# Patient Record
Sex: FEMALE | Race: WHITE | NOT HISPANIC OR LATINO | Employment: OTHER | URBAN - NONMETROPOLITAN AREA
[De-identification: names, ages, dates, MRNs, and addresses within clinical notes are randomized per-mention and may not be internally consistent; named-entity substitution may affect disease eponyms.]

---

## 2018-11-20 ENCOUNTER — CONSULT (OUTPATIENT)
Dept: ONCOLOGY | Facility: CLINIC | Age: 68
End: 2018-11-20

## 2018-11-20 VITALS
BODY MASS INDEX: 19.46 KG/M2 | SYSTOLIC BLOOD PRESSURE: 110 MMHG | OXYGEN SATURATION: 93 % | HEIGHT: 64 IN | RESPIRATION RATE: 16 BRPM | HEART RATE: 77 BPM | DIASTOLIC BLOOD PRESSURE: 55 MMHG | TEMPERATURE: 99.1 F | WEIGHT: 114 LBS

## 2018-11-20 DIAGNOSIS — C34.92 NON-SMALL CELL CANCER OF LEFT LUNG (HCC): Primary | ICD-10-CM

## 2018-11-20 PROCEDURE — 99205 OFFICE O/P NEW HI 60 MIN: CPT | Performed by: INTERNAL MEDICINE

## 2018-11-20 RX ORDER — BENZONATATE 100 MG/1
100 CAPSULE ORAL 3 TIMES DAILY PRN
COMMUNITY
End: 2022-09-20

## 2018-11-20 RX ORDER — LOSARTAN POTASSIUM 50 MG/1
50 TABLET ORAL DAILY
COMMUNITY
Start: 2018-09-08 | End: 2022-09-16 | Stop reason: SDUPTHER

## 2018-11-20 RX ORDER — LEVOTHYROXINE SODIUM 0.1 MG/1
100 TABLET ORAL DAILY
COMMUNITY
Start: 2018-11-10 | End: 2022-09-20

## 2018-11-20 RX ORDER — PROMETHAZINE HYDROCHLORIDE 25 MG/1
25 TABLET ORAL EVERY 6 HOURS PRN
COMMUNITY

## 2018-11-20 RX ORDER — HYDROCODONE BITARTRATE AND ACETAMINOPHEN 5; 325 MG/1; MG/1
1 TABLET ORAL EVERY 6 HOURS PRN
COMMUNITY
End: 2023-03-14

## 2018-11-20 RX ORDER — MONTELUKAST SODIUM 10 MG/1
10 TABLET ORAL DAILY
COMMUNITY
Start: 2018-09-08

## 2018-11-20 RX ORDER — SIMVASTATIN 20 MG
20 TABLET ORAL DAILY
COMMUNITY
Start: 2018-11-10 | End: 2022-06-05 | Stop reason: SDUPTHER

## 2018-11-20 RX ORDER — PROCHLORPERAZINE MALEATE 10 MG
10 TABLET ORAL EVERY 6 HOURS PRN
COMMUNITY
End: 2023-01-24

## 2018-11-20 RX ORDER — FERROUS SULFATE 325(65) MG
325 TABLET ORAL
COMMUNITY
End: 2023-01-24

## 2018-11-20 NOTE — PROGRESS NOTES
CHIEF COMPLAINT: Second opinion for lung cancer    REASON FOR REFERRAL: Same      RECORDS OBTAINED  Records of the patients history including those obtained from  Dr. Quiroz were reviewed and summarized in detail.    Oncology/Hematology History    1. Non-small cell lung cancer stage IIIc T3 N3 M0 poorly differentiated squamous cell carcinoma seen previously by Dr. Quiroz.  I do not have her images but according to records from Dr. Quiroz, this was found as an obstructing mass in the left lower lobe on bronchoscopy and mediastinal nodes were involved and hence precluded surgical resection.  Molecular testing including EGFR and ALK was reportedly negative.  I do not know if Ros 1 was tested or not.  PDL 1 was considered limited.  She received carboplatin and Taxol plus radiation in August.  Restaging PET 9/6/18 showed significant improvement with resolution of precarinal and subcarinal and posterior left midthoracic hypermetabolism with some residua SUV 4.8.  She was started on Durvalumab October 2018.  I saw for the first time 11/20/18.  2. COPD  3. Hiatal hernia with reflux  4. Hyperlipidemia  5. Hypertension  6. Hypothyroidism  7. Quit 40 pack year history of smoking        Non-small cell cancer of left lung (CMS/HCC)    11/20/2018 Initial Diagnosis     Non-small cell cancer of left lung (CMS/HCC)            HISTORY OF PRESENT ILLNESS:  The patient is a 68 y.o.  female, referred for second opinion on lung cancer.  See above for her history    REVIEW OF SYSTEMS:  A 14 point review of systems was performed and is negative except as noted above.    Past Medical History:   Diagnosis Date   • Disease of thyroid gland    • Hypertension      Past Surgical History:   Procedure Laterality Date   • TUBAL ABDOMINAL LIGATION  1984    [place]       Current Outpatient Medications on File Prior to Visit   Medication Sig Dispense Refill   • benzonatate (TESSALON) 100 MG capsule Take 100 mg by mouth 3 (Three) Times a Day  "As Needed for Cough.     • ferrous sulfate 325 (65 FE) MG tablet Take 325 mg by mouth Daily With Breakfast.     • HYDROcod Polst-CPM Polst ER (TUSSIONEX PENNKINETIC ER) 10-8 MG/5ML ER suspension Take 5 mL by mouth Every 12 (Twelve) Hours As Needed.     • HYDROcodone-acetaminophen (NORCO) 5-325 MG per tablet Take 1 tablet by mouth Every 6 (Six) Hours As Needed.     • prochlorperazine (COMPAZINE) 10 MG tablet Take 10 mg by mouth Every 6 (Six) Hours As Needed for Nausea or Vomiting.     • promethazine (PHENERGAN) 25 MG tablet Take 25 mg by mouth Every 6 (Six) Hours As Needed for Nausea or Vomiting.     • levothyroxine (SYNTHROID, LEVOTHROID) 100 MCG tablet Take 100 mcg by mouth Daily.     • losartan (COZAAR) 50 MG tablet Take 50 mg by mouth Daily.     • montelukast (SINGULAIR) 10 MG tablet Take 10 mg by mouth Daily.     • simvastatin (ZOCOR) 20 MG tablet Take 20 mg by mouth Daily.       No current facility-administered medications on file prior to visit.        No Known Allergies    Social History     Socioeconomic History   • Marital status: Unknown     Spouse name: Not on file   • Number of children: Not on file   • Years of education: Not on file   • Highest education level: Not on file   Tobacco Use   • Smoking status: Former Smoker     Packs/day: 2.00     Years: 5.00     Pack years: 10.00     Last attempt to quit:      Years since quittin.8   • Smokeless tobacco: Never Used       Family History   Problem Relation Age of Onset   • Breast cancer Sister        PHYSICAL EXAM:    /55   Pulse 77   Temp 99.1 °F (37.3 °C)   Resp 16   Ht 162.6 cm (64\")   Wt 51.7 kg (114 lb)   SpO2 93%   BMI 19.57 kg/m²     ECOG: (0) Fully active, able to carry on all predisease performance without restriction  General: well appearing female in no acute distress  HEENT: sclera anicteric, oropharynx clear  Lymphatics: no cervical, supraclavicular, inguinal, or axillary adenopathy  Cardiovascular: regular rate and rhythm, " no murmurs  Neck: Supple; No thyromegaly  Lungs: clear to auscultation bilaterally. No respiratory distress.   Abdomen: soft, nontender, nondistended.  No palpable organomegaly  Extremities: no cyanosis, clubbing, edema, or cords  Skin: no rashes, lesions, bruising, or petechiae  Neuro: Alert and oriented x 4; Moving all extremities.  Psych: No anxiety or depression    No visits with results within 6 Week(s) from this visit.   Latest known visit with results is:   No results found for any previous visit.       Assessment/Plan     1. Lung cancer squamous cell carcinoma poorly differentiated: I reviewed her history and information provided from Dr. Quiroz.  For unresectable stage III squamous cell carcinoma of the lung, I consider carboplatin and Taxol with radiation followed by Durvalumab to be a very reasonable plan based on the results of the [place] trial and would've been precisely what I would've recommended were I am discharge from the beginning of her therapy.  Commended the care and decision making capabilities of Dr. Quiroz.  She will follow-up with Dr. Quiroz and I remain available should questions arise.  Discussed with patient 1 hour greater than 50% spent counseling.      Darci Alcala MD    11/20/2018

## 2022-06-04 NOTE — TELEPHONE ENCOUNTER
Rx Refill Note  Requested Prescriptions     Pending Prescriptions Disp Refills   • simvastatin (ZOCOR) 20 MG tablet [Pharmacy Med Name: Simvastatin 20 MG Oral Tablet] 90 tablet 0     Sig: TAKE 1 TABLET BY MOUTH ONCE DAILY IN THE EVENING      Last office visit with prescribing clinician: Visit date not found      Next office visit with prescribing clinician: Visit date not found            Eron Colin MA  06/04/22, 12:26 EDT

## 2022-06-05 RX ORDER — SIMVASTATIN 20 MG
TABLET ORAL
Qty: 90 TABLET | Refills: 0 | Status: SHIPPED | OUTPATIENT
Start: 2022-06-05 | End: 2022-09-20 | Stop reason: SDUPTHER

## 2022-06-27 RX ORDER — LEVOTHYROXINE SODIUM 75 UG/1
TABLET ORAL
Qty: 90 TABLET | Refills: 0 | Status: SHIPPED | OUTPATIENT
Start: 2022-06-27 | End: 2022-09-20

## 2022-08-29 RX ORDER — LOSARTAN POTASSIUM 50 MG/1
TABLET ORAL
Qty: 90 TABLET | Refills: 0 | OUTPATIENT
Start: 2022-08-29

## 2022-08-31 RX ORDER — SIMVASTATIN 20 MG
TABLET ORAL
Qty: 90 TABLET | Refills: 0 | OUTPATIENT
Start: 2022-08-31

## 2022-09-06 RX ORDER — LOSARTAN POTASSIUM 50 MG/1
TABLET ORAL
Qty: 90 TABLET | Refills: 0 | OUTPATIENT
Start: 2022-09-06

## 2022-09-14 RX ORDER — LOSARTAN POTASSIUM 50 MG/1
TABLET ORAL
Qty: 90 TABLET | Refills: 0 | OUTPATIENT
Start: 2022-09-14

## 2022-09-16 RX ORDER — LOSARTAN POTASSIUM 50 MG/1
50 TABLET ORAL DAILY
Qty: 30 TABLET | Refills: 0 | Status: SHIPPED | OUTPATIENT
Start: 2022-09-16 | End: 2022-09-20 | Stop reason: SDUPTHER

## 2022-09-16 NOTE — TELEPHONE ENCOUNTER
Caller: Curt Nessa    Relationship: Self    Best call back number: [phone]    Requested Prescriptions:   Requested Prescriptions     Pending Prescriptions Disp Refills   • losartan (COZAAR) 50 MG tablet       Sig: Take 1 tablet by mouth Daily.      Euthyrox 75 MCG tablet    Pharmacy where request should be sent: [facility] [address] [place] [address] [phone] [phone]     Additional details provided by patient:     PATIENT STATED SHE IS OUT OF THE LOSARTAN AND HAS BEEN FOR 2 WEEKS    PATIENT IS ON HER LAST WEEK OF EUTHYROX    Does the patient have less than a 3 day supply:  [x] Yes  [] No    Denver Calabrese Rep   09/16/22 11:03 EDT

## 2022-09-20 ENCOUNTER — OFFICE VISIT (OUTPATIENT)
Dept: FAMILY MEDICINE CLINIC | Facility: CLINIC | Age: 72
End: 2022-09-20

## 2022-09-20 VITALS — BODY MASS INDEX: 16.73 KG/M2 | HEIGHT: 64 IN | WEIGHT: 98 LBS

## 2022-09-20 DIAGNOSIS — E46 PROTEIN-CALORIE MALNUTRITION, UNSPECIFIED SEVERITY: ICD-10-CM

## 2022-09-20 DIAGNOSIS — C34.30 NON-SMALL CELL CANCER OF LOWER LOBE OF LUNG: ICD-10-CM

## 2022-09-20 DIAGNOSIS — E78.2 MIXED HYPERLIPIDEMIA: ICD-10-CM

## 2022-09-20 DIAGNOSIS — I10 PRIMARY HYPERTENSION: ICD-10-CM

## 2022-09-20 DIAGNOSIS — E03.9 ACQUIRED HYPOTHYROIDISM: Primary | ICD-10-CM

## 2022-09-20 PROCEDURE — 99214 OFFICE O/P EST MOD 30 MIN: CPT | Performed by: FAMILY MEDICINE

## 2022-09-20 RX ORDER — OMEPRAZOLE 20 MG/1
20 CAPSULE, DELAYED RELEASE ORAL DAILY
COMMUNITY
Start: 2022-08-21 | End: 2022-09-20 | Stop reason: SDUPTHER

## 2022-09-20 RX ORDER — BLACK COHOSH ROOT EXTRACT 80 MG
1 CAPSULE ORAL DAILY
COMMUNITY

## 2022-09-20 RX ORDER — SIMVASTATIN 20 MG
20 TABLET ORAL EVERY EVENING
Qty: 90 TABLET | Refills: 1 | Status: SHIPPED | OUTPATIENT
Start: 2022-09-20 | End: 2023-01-24

## 2022-09-20 RX ORDER — LEVOTHYROXINE SODIUM 0.07 MG/1
75 TABLET ORAL DAILY
Qty: 90 TABLET | Refills: 1 | Status: CANCELLED | OUTPATIENT
Start: 2022-09-20

## 2022-09-20 RX ORDER — LOSARTAN POTASSIUM 50 MG/1
50 TABLET ORAL DAILY
Qty: 90 TABLET | Refills: 1 | Status: SHIPPED | OUTPATIENT
Start: 2022-09-20 | End: 2023-01-24

## 2022-09-20 RX ORDER — LEVOTHYROXINE SODIUM 0.1 MG/1
100 TABLET ORAL
Qty: 90 TABLET | Refills: 3 | Status: SHIPPED | OUTPATIENT
Start: 2022-09-20 | End: 2022-11-11

## 2022-09-20 RX ORDER — OMEPRAZOLE 20 MG/1
20 CAPSULE, DELAYED RELEASE ORAL DAILY
Qty: 90 CAPSULE | Refills: 1 | Status: SHIPPED | OUTPATIENT
Start: 2022-09-20

## 2022-09-20 NOTE — PROGRESS NOTES
Patient was seen today through synchronous audio/video technology. Verbal consent was obtained. The patient was located at home. Dr. Tirado was located at [facility] in [place], [place]. Vitals signs were not obtained due to lack of home monitoring access.       Date: 2022   Patient Name: Nessa Elias  : 1950   MRN: [id number]     Chief Complaint:    Chief Complaint   Patient presents with   • Med Refill       History of Present Illness: Nessa Elias is a 72 y.o. female who is here today for Medication refills.  Recent labs 2 weeks ago revealed a TSH of 116.  Her oncologist instructed her to start taking 1 and half tablets of her Synthroid.  She is taking the medication correctly first thing in the morning on an empty stomach.    She also requests refills of losartan, simvastatin, and omeprazole that she reports continues to work well without side effects.  She is currently undergoing treatment for non-small cell lung cancer.           Review of Systems:   Review of Systems   Constitutional: Positive for fatigue and unexpected weight loss. Negative for chills and fever.   Respiratory: Negative for cough and shortness of breath.    Cardiovascular: Negative for chest pain and palpitations.   Gastrointestinal: Negative for abdominal pain, constipation, diarrhea, nausea and vomiting.   Musculoskeletal: Negative for back pain and myalgias.   Neurological: Negative for dizziness and headache.   Psychiatric/Behavioral: Negative for depressed mood. The patient is not nervous/anxious.        Past Medical History:   Past Medical History:   Diagnosis Date   • Breast cancer (HCC)    • Disease of thyroid gland    • Hyperlipidemia    • Hypertension    • Hypertension    • Thyroid disease        Past Surgical History:   Past Surgical History:   Procedure Laterality Date   • TUBAL ABDOMINAL LIGATION      [place]       Family History:   Family History   Problem Relation Age of Onset   • Breast  "cancer Sister        Social History:   Social History     Socioeconomic History   • Marital status: Unknown   Tobacco Use   • Smoking status: Former Smoker     Packs/day: 2.00     Years: 5.00     Pack years: 10.00     Quit date:      Years since quittin.7   • Smokeless tobacco: Never Used   Vaping Use   • Vaping Use: Never used   Substance and Sexual Activity   • Alcohol use: Not Currently   • Drug use: Never   • Sexual activity: Defer       Medications:     Current Outpatient Medications:   •  Bioflavonoid Products (Quercetin Complex Immune) capsule, Take 1 capsule by mouth Daily., Disp: , Rfl:   •  ferrous sulfate 325 (65 FE) MG tablet, Take 325 mg by mouth Daily With Breakfast., Disp: , Rfl:   •  HYDROcodone-acetaminophen (NORCO) 5-325 MG per tablet, Take 1 tablet by mouth Every 6 (Six) Hours As Needed., Disp: , Rfl:   •  losartan (COZAAR) 50 MG tablet, Take 1 tablet by mouth Daily., Disp: 90 tablet, Rfl: 1  •  montelukast (SINGULAIR) 10 MG tablet, Take 10 mg by mouth Daily., Disp: , Rfl:   •  omeprazole (priLOSEC) 20 MG capsule, Take 1 capsule by mouth Daily., Disp: 90 capsule, Rfl: 1  •  prochlorperazine (COMPAZINE) 10 MG tablet, Take 10 mg by mouth Every 6 (Six) Hours As Needed for Nausea or Vomiting., Disp: , Rfl:   •  promethazine (PHENERGAN) 25 MG tablet, Take 25 mg by mouth Every 6 (Six) Hours As Needed for Nausea or Vomiting., Disp: , Rfl:   •  simvastatin (ZOCOR) 20 MG tablet, Take 1 tablet by mouth Every Evening., Disp: 90 tablet, Rfl: 1  •  levothyroxine (SYNTHROID, LEVOTHROID) 100 MCG tablet, Take 1 tablet by mouth Every Morning., Disp: 90 tablet, Rfl: 3    Allergies:   No Known Allergies      Physical Exam:  Vital Signs:   Vitals:    22 1343   Weight: 44.5 kg (98 lb)   Height: 162.6 cm (64\")     Body mass index is 16.82 kg/m².     Physical Exam  Vitals and nursing note reviewed.   Constitutional:       Comments: Chronically ill-appearing, frail   Pulmonary:      Effort: Pulmonary effort " is normal.   Neurological:      Mental Status: She is alert and oriented to person, place, and time.   Psychiatric:         Mood and Affect: Mood normal.         Behavior: Behavior normal.           Assessment/Plan:   Diagnoses and all orders for this visit:    1. Acquired hypothyroidism (Primary)  Assessment & Plan:  Increase Synthroid to 100 mcg daily and we will recheck labs in 6 to 8 weeks    Orders:  -     levothyroxine (SYNTHROID, LEVOTHROID) 100 MCG tablet; Take 1 tablet by mouth Every Morning.  Dispense: 90 tablet; Refill: 3    2. Primary hypertension  Assessment & Plan:  Hypertension is Stable.  Continue current treatment regimen.  Blood pressure will be reassessed at the next regular appointment.    Orders:  -     losartan (COZAAR) 50 MG tablet; Take 1 tablet by mouth Daily.  Dispense: 90 tablet; Refill: 1    3. Mixed hyperlipidemia  Assessment & Plan:  Lipid abnormalities are Stable.  Nutritional counseling was provided. and Pharmacotherapy as ordered.  Lipids will be reassessed in 6 months.    Orders:  -     simvastatin (ZOCOR) 20 MG tablet; Take 1 tablet by mouth Every Evening.  Dispense: 90 tablet; Refill: 1    4. Non-small cell cancer of lower lobe of lung (HCC)  Assessment & Plan:  Stable, followed by oncology Dr. Alcala      5. Protein-calorie malnutrition, unspecified severity (HCC)  Assessment & Plan:  Stable, this is most certainly secondary to recent treatment for lung cancer.  Encouraged her to increase protein intake and she may drink a boost or Ensure 1-2 times daily      Other orders  -     omeprazole (priLOSEC) 20 MG capsule; Take 1 capsule by mouth Daily.  Dispense: 90 capsule; Refill: 1         Follow Up:   Return in about 2 months (around 11/20/2022).    Jaycee Tirado DO  [facility]

## 2022-09-24 PROBLEM — I10 PRIMARY HYPERTENSION: Status: ACTIVE | Noted: 2022-09-24

## 2022-09-24 PROBLEM — E46 PROTEIN-CALORIE MALNUTRITION: Status: ACTIVE | Noted: 2022-09-24

## 2022-09-24 PROBLEM — E03.9 ACQUIRED HYPOTHYROIDISM: Status: ACTIVE | Noted: 2022-09-24

## 2022-09-24 PROBLEM — E78.2 MIXED HYPERLIPIDEMIA: Status: ACTIVE | Noted: 2022-09-24

## 2022-09-24 NOTE — ASSESSMENT & PLAN NOTE
Lipid abnormalities are Stable.  Nutritional counseling was provided. and Pharmacotherapy as ordered.  Lipids will be reassessed in 6 months.

## 2022-09-24 NOTE — ASSESSMENT & PLAN NOTE
Stable, this is most certainly secondary to recent treatment for lung cancer.  Encouraged her to increase protein intake and she may drink a boost or Ensure 1-2 times daily

## 2022-09-24 NOTE — ASSESSMENT & PLAN NOTE
Hypertension is Stable.  Continue current treatment regimen.  Blood pressure will be reassessed at the next regular appointment.

## 2022-11-11 ENCOUNTER — OFFICE VISIT (OUTPATIENT)
Dept: FAMILY MEDICINE CLINIC | Facility: CLINIC | Age: 72
End: 2022-11-11

## 2022-11-11 DIAGNOSIS — E03.9 ACQUIRED HYPOTHYROIDISM: ICD-10-CM

## 2022-11-11 DIAGNOSIS — I10 PRIMARY HYPERTENSION: Primary | ICD-10-CM

## 2022-11-11 PROCEDURE — 99213 OFFICE O/P EST LOW 20 MIN: CPT | Performed by: FAMILY MEDICINE

## 2022-11-11 RX ORDER — LEVOTHYROXINE SODIUM 0.12 MG/1
125 TABLET ORAL DAILY
COMMUNITY
End: 2023-01-24 | Stop reason: SDUPTHER

## 2022-11-11 NOTE — PROGRESS NOTES
Patient was seen today through synchronous audio/video technology. Verbal consent was obtained. The patient was located at home. Dr. Tirado was located at [facility] in [place], [place]. Vitals signs were not obtained due to lack of home monitoring access.       Date: 2022   Patient Name: Nessa Elias  : 1950   MRN: [id number]     Chief Complaint:    Chief Complaint   Patient presents with   • Hypertension     Follow up blood pressure        History of Present Illness: Nessa Elias is a 72 y.o. female who is here today to follow up for Hypertension and hypothyroidism.  She has not been checking blood pressures at home but reports that her appointments for lung cancer treatment blood pressures have been within normal limits.  She did recently have blood work and TSH was still elevated so her Synthroid was increased to 120 mcg daily and recent labs were normal.  She has no other questions or concerns today.           Review of Systems:   Review of Systems   Constitutional: Negative for chills, fatigue and fever.   Respiratory: Negative for cough and shortness of breath.    Cardiovascular: Negative for chest pain and palpitations.   Gastrointestinal: Negative for abdominal pain, constipation, diarrhea, nausea and vomiting.   Musculoskeletal: Negative for back pain and myalgias.   Neurological: Negative for dizziness and headache.   Psychiatric/Behavioral: Negative for depressed mood. The patient is not nervous/anxious.        Past Medical History:   Past Medical History:   Diagnosis Date   • Breast cancer (HCC)    • Disease of thyroid gland    • Hyperlipidemia    • Hypertension    • Hypertension    • Lung cancer (HCC)    • Thyroid disease        Past Surgical History:   Past Surgical History:   Procedure Laterality Date   • TUBAL ABDOMINAL LIGATION      [place]       Family History:   Family History   Problem Relation Age of Onset   • Breast cancer Sister        Social History:    Social History     Socioeconomic History   • Marital status: Unknown   Tobacco Use   • Smoking status: Former     Packs/day: 2.00     Years: 5.00     Pack years: 10.00     Types: Cigarettes     Quit date:      Years since quittin.8   • Smokeless tobacco: Never   Vaping Use   • Vaping Use: Never used   Substance and Sexual Activity   • Alcohol use: Not Currently   • Drug use: Never   • Sexual activity: Defer       Medications:     Current Outpatient Medications:   •  benzonatate (TESSALON) 200 MG capsule, Take 1 capsule by mouth 3 (Three) Times a Day As Needed for Cough., Disp: 30 capsule, Rfl: 0  •  Bioflavonoid Products (Quercetin Complex Immune) capsule, Take 1 capsule by mouth Daily., Disp: , Rfl:   •  ferrous sulfate 325 (65 FE) MG tablet, Take 325 mg by mouth Daily With Breakfast., Disp: , Rfl:   •  guaiFENesin-codeine (GUAIFENESIN AC) 100-10 MG/5ML liquid, Take 5 mL by mouth 4 (Four) Times a Day As Needed for Cough., Disp: 118 mL, Rfl: 0  •  HYDROcodone-acetaminophen (NORCO) 5-325 MG per tablet, Take 1 tablet by mouth Every 6 (Six) Hours As Needed., Disp: , Rfl:   •  levothyroxine (SYNTHROID, LEVOTHROID) 125 MCG tablet, Take 1 tablet by mouth Daily., Disp: , Rfl:   •  losartan (COZAAR) 50 MG tablet, Take 1 tablet by mouth Daily., Disp: 90 tablet, Rfl: 1  •  montelukast (SINGULAIR) 10 MG tablet, Take 10 mg by mouth Daily., Disp: , Rfl:   •  Nirmatrelvir&Ritonavir 300/100 (PAXLOVID) 20 x 150 MG & 10 x 100MG tablet therapy pack tablet, Take 3 tablets by mouth 2 (Two) Times a Day for 5 days., Disp: 30 tablet, Rfl: 0  •  omeprazole (priLOSEC) 20 MG capsule, Take 1 capsule by mouth Daily., Disp: 90 capsule, Rfl: 1  •  prochlorperazine (COMPAZINE) 10 MG tablet, Take 10 mg by mouth Every 6 (Six) Hours As Needed for Nausea or Vomiting., Disp: , Rfl:   •  promethazine (PHENERGAN) 25 MG tablet, Take 25 mg by mouth Every 6 (Six) Hours As Needed for Nausea or Vomiting., Disp: , Rfl:   •  simvastatin (ZOCOR) 20 MG  tablet, Take 1 tablet by mouth Every Evening., Disp: 90 tablet, Rfl: 1    Allergies:   No Known Allergies    PHQ-2 Total Score:     PHQ-9 Total Score:       Physical Exam:  Vital Signs: There were no vitals filed for this visit.  There is no height or weight on file to calculate BMI.     Physical Exam  Vitals and nursing note reviewed.   Pulmonary:      Effort: Pulmonary effort is normal.   Neurological:      Mental Status: She is alert and oriented to person, place, and time.   Psychiatric:         Mood and Affect: Mood normal.           Assessment/Plan:   Diagnoses and all orders for this visit:    1. Primary hypertension (Primary)  Assessment & Plan:  Hypertension is Stable.  Continue current treatment regimen.  Blood pressure will be reassessed at the next regular appointment.      2. Acquired hypothyroidism  Assessment & Plan:  Improved on increased dose of Synthroid.           Follow Up:   Return in about 6 months (around 5/11/2023) for Recheck with labs.    Jaycee Tirado, DO  [facility]

## 2022-12-08 ENCOUNTER — TELEPHONE (OUTPATIENT)
Dept: FAMILY MEDICINE CLINIC | Facility: CLINIC | Age: 72
End: 2022-12-08

## 2022-12-08 NOTE — TELEPHONE ENCOUNTER
[facility] PATIENT  Denia from Hospice called to let Dr. Tirado know that her pt has been admitted to Hospice Care.  Dr. Montanez is d/c Iron and Simvastatin.  Pt is concerned about stopping Simvastatin and wanted to see what you think.

## 2023-01-24 ENCOUNTER — OFFICE VISIT (OUTPATIENT)
Dept: FAMILY MEDICINE CLINIC | Facility: CLINIC | Age: 73
End: 2023-01-24
Payer: MEDICARE

## 2023-01-24 VITALS
DIASTOLIC BLOOD PRESSURE: 64 MMHG | HEIGHT: 64 IN | SYSTOLIC BLOOD PRESSURE: 98 MMHG | BODY MASS INDEX: 16.58 KG/M2 | TEMPERATURE: 97.3 F | WEIGHT: 97.1 LBS

## 2023-01-24 DIAGNOSIS — E03.9 ACQUIRED HYPOTHYROIDISM: Primary | ICD-10-CM

## 2023-01-24 PROCEDURE — 99213 OFFICE O/P EST LOW 20 MIN: CPT | Performed by: FAMILY MEDICINE

## 2023-01-24 PROCEDURE — 36415 COLL VENOUS BLD VENIPUNCTURE: CPT | Performed by: FAMILY MEDICINE

## 2023-01-24 RX ORDER — LEVOTHYROXINE SODIUM 0.12 MG/1
125 TABLET ORAL DAILY
Qty: 90 TABLET | Refills: 1 | Status: SHIPPED | OUTPATIENT
Start: 2023-01-24

## 2023-01-24 NOTE — PROGRESS NOTES
Date: 2023   Patient Name: Nessa Elias  : 1950   MRN: [id number]     Chief Complaint:    Chief Complaint   Patient presents with   • Hypothyroidism     Med refill        History of Present Illness: Nessa Elias is a 72 y.o. female who is here today to follow up for Hypothyroidism.  She requests refill of Synthroid which she reports continues to work well for her symptoms.  She denies side effects to the medication.  She is due for repeat blood work.  She recently finished radiation treatments for non-small cell lung cancer.           Review of Systems:   Review of Systems   Constitutional: Negative for chills, fatigue and fever.   Respiratory: Negative for cough and shortness of breath.    Cardiovascular: Negative for chest pain and palpitations.   Gastrointestinal: Negative for abdominal pain, constipation, diarrhea, nausea and vomiting.   Musculoskeletal: Negative for back pain and myalgias.   Neurological: Negative for dizziness and headache.   Psychiatric/Behavioral: Negative for depressed mood. The patient is not nervous/anxious.        Past Medical History:   Past Medical History:   Diagnosis Date   • Breast cancer (HCC)    • Disease of thyroid gland    • Hyperlipidemia    • Hypertension    • Hypertension    • Lung cancer (HCC)    • Thyroid disease        Past Surgical History:   Past Surgical History:   Procedure Laterality Date   • TUBAL ABDOMINAL LIGATION      [place]       Family History:   Family History   Problem Relation Age of Onset   • Breast cancer Sister        Social History:   Social History     Socioeconomic History   • Marital status: Unknown   Tobacco Use   • Smoking status: Former     Packs/day: 2.00     Years: 5.00     Pack years: 10.00     Types: Cigarettes     Quit date:      Years since quitting: 10.0   • Smokeless tobacco: Never   Vaping Use   • Vaping Use: Never used   Substance and Sexual Activity   • Alcohol use: Not Currently   • Drug use: Never   • Sexual  "activity: Defer       Medications:     Current Outpatient Medications:   •  benzonatate (TESSALON) 200 MG capsule, Take 1 capsule by mouth 3 (Three) Times a Day As Needed for Cough., Disp: 30 capsule, Rfl: 0  •  Bioflavonoid Products (Quercetin Complex Immune) capsule, Take 1 capsule by mouth Daily., Disp: , Rfl:   •  guaiFENesin-codeine (GUAIFENESIN AC) 100-10 MG/5ML liquid, Take 5 mL by mouth 4 (Four) Times a Day As Needed for Cough., Disp: 118 mL, Rfl: 0  •  HYDROcodone-acetaminophen (NORCO) 5-325 MG per tablet, Take 1 tablet by mouth Every 6 (Six) Hours As Needed., Disp: , Rfl:   •  levothyroxine (SYNTHROID, LEVOTHROID) 125 MCG tablet, Take 1 tablet by mouth Daily., Disp: 90 tablet, Rfl: 1  •  montelukast (SINGULAIR) 10 MG tablet, Take 10 mg by mouth Daily., Disp: , Rfl:   •  omeprazole (priLOSEC) 20 MG capsule, Take 1 capsule by mouth Daily., Disp: 90 capsule, Rfl: 1  •  promethazine (PHENERGAN) 25 MG tablet, Take 25 mg by mouth Every 6 (Six) Hours As Needed for Nausea or Vomiting., Disp: , Rfl:     Allergies:   No Known Allergies    PHQ-2 Total Score: 0   PHQ-9 Total Score: 0     Physical Exam:  Vital Signs:   Vitals:    01/24/23 1113   BP: 98/64   BP Location: Right arm   Patient Position: Sitting   Cuff Size: Adult   Temp: 97.3 °F (36.3 °C)   TempSrc: Temporal   Weight: 44 kg (97 lb 1.6 oz)   Height: 162.6 cm (64\")     Body mass index is 16.67 kg/m².     Physical Exam  Vitals and nursing note reviewed.   Constitutional:       Comments: Chronically ill-appearing, cachectic   Cardiovascular:      Rate and Rhythm: Normal rate and regular rhythm.      Heart sounds: Normal heart sounds. No murmur heard.  Pulmonary:      Effort: Pulmonary effort is normal.      Breath sounds: Normal breath sounds. No wheezing.   Neurological:      Mental Status: She is alert and oriented to person, place, and time. Mental status is at baseline.   Psychiatric:         Mood and Affect: Mood normal.         Behavior: Behavior normal. "           Assessment/Plan:   Diagnoses and all orders for this visit:    1. Acquired hypothyroidism (Primary)  Assessment & Plan:  Stable, labs today and medications refilled    Orders:  -     levothyroxine (SYNTHROID, LEVOTHROID) 125 MCG tablet; Take 1 tablet by mouth Daily.  Dispense: 90 tablet; Refill: 1  -     TSH; Future  -     T4, Free; Future         Follow Up:   Return in about 6 months (around 7/24/2023) for Annual physical, Medicare Wellness.    Jaycee Tirado DO  [facility]

## 2023-01-25 LAB
T4 FREE SERPL-MCNC: 0.69 NG/DL (ref 0.82–1.77)
TSH SERPL DL<=0.005 MIU/L-ACNC: 15.7 UIU/ML (ref 0.45–4.5)

## 2023-02-28 ENCOUNTER — TELEPHONE (OUTPATIENT)
Dept: FAMILY MEDICINE CLINIC | Facility: CLINIC | Age: 73
End: 2023-02-28

## 2023-02-28 ENCOUNTER — LAB (OUTPATIENT)
Dept: FAMILY MEDICINE CLINIC | Facility: CLINIC | Age: 73
End: 2023-02-28
Payer: MEDICARE

## 2023-02-28 DIAGNOSIS — E03.9 ACQUIRED HYPOTHYROIDISM: Primary | ICD-10-CM

## 2023-02-28 DIAGNOSIS — E03.9 ACQUIRED HYPOTHYROIDISM: ICD-10-CM

## 2023-02-28 PROCEDURE — 36415 COLL VENOUS BLD VENIPUNCTURE: CPT | Performed by: FAMILY MEDICINE

## 2023-03-01 LAB
T4 FREE SERPL-MCNC: 2.68 NG/DL (ref 0.82–1.77)
TSH SERPL DL<=0.005 MIU/L-ACNC: 0.04 UIU/ML (ref 0.45–4.5)

## 2023-03-01 NOTE — TELEPHONE ENCOUNTER
Called patient and she is no longer in hospice care. Do you want her to make an appointment to discuss this?

## 2023-03-14 ENCOUNTER — OFFICE VISIT (OUTPATIENT)
Dept: FAMILY MEDICINE CLINIC | Facility: CLINIC | Age: 73
End: 2023-03-14
Payer: MEDICARE

## 2023-03-14 VITALS — BODY MASS INDEX: 16.56 KG/M2 | WEIGHT: 97 LBS | HEIGHT: 64 IN

## 2023-03-14 DIAGNOSIS — C34.92 NON-SMALL CELL CANCER OF LEFT LUNG: Primary | ICD-10-CM

## 2023-03-14 PROBLEM — J44.9 CHRONIC OBSTRUCTIVE PULMONARY DISEASE, UNSPECIFIED: Status: ACTIVE | Noted: 2022-12-08

## 2023-03-14 PROBLEM — C34.32 MALIGNANT NEOPLASM OF LOWER LOBE, LEFT BRONCHUS OR LUNG: Status: ACTIVE | Noted: 2022-12-08

## 2023-03-14 PROBLEM — Z86.16 PERSONAL HISTORY OF COVID-19: Status: ACTIVE | Noted: 2022-12-08

## 2023-03-14 PROCEDURE — 99213 OFFICE O/P EST LOW 20 MIN: CPT | Performed by: FAMILY MEDICINE

## 2023-03-14 RX ORDER — HYDROCODONE BITARTRATE AND ACETAMINOPHEN 5; 325 MG/1; MG/1
1 TABLET ORAL EVERY 6 HOURS PRN
Qty: 30 TABLET | Refills: 0 | Status: SHIPPED | OUTPATIENT
Start: 2023-03-14

## 2023-03-14 NOTE — ASSESSMENT & PLAN NOTE
We discussed different treatment options and the patient would like to move forward with prescription for hydrocodone 5-325 mg to use as needed for her cancer related pain.  Adverse effects discussed.  Avoid alcohol, driving, or operating machinery while taking medications.

## 2023-03-14 NOTE — PROGRESS NOTES
Patient was seen today through synchronous audio/video technology. Verbal consent was obtained. The patient was located at home. Dr. Tirado was located at [facility] in [place], [place]. Vitals signs were obtained by patient and recorded.          Date: 2023   Patient Name: Nessa Elias  : 1950   MRN: [id number]     Chief Complaint:    Chief Complaint   Patient presents with   • Lung Cancer     Discuss pain medication       History of Present Illness: Nessa Elias is a 72 y.o. female who is here today for Non-small cell lung cancer.  The patient is undergoing treatment for more than 6 months and has had progression of her non-small cell lung cancer.  She is now having pain associated with this and would like to discuss being prescribed as needed pain medication for her cancer pain.  She was prescribed hydrocodone 5/325 days ago and reports she did well without significant side effects.           Review of Systems:   Review of Systems   Constitutional: Positive for unexpected weight loss. Negative for chills, fatigue and fever.   Respiratory: Positive for cough. Negative for shortness of breath.         Chest wall pain   Cardiovascular: Negative for chest pain and palpitations.   Gastrointestinal: Negative for abdominal pain, constipation, diarrhea, nausea and vomiting.   Musculoskeletal: Positive for arthralgias, back pain, neck pain and neck stiffness. Negative for myalgias.   Neurological: Negative for dizziness and headache.   Psychiatric/Behavioral: Negative for depressed mood. The patient is not nervous/anxious.        Past Medical History:   Past Medical History:   Diagnosis Date   • Breast cancer (HCC)    • Disease of thyroid gland    • Hyperlipidemia    • Hypertension    • Hypertension    • Lung cancer (HCC)    • Thyroid disease        Past Surgical History:   Past Surgical History:   Procedure Laterality Date   • TUBAL ABDOMINAL LIGATION      [facility]  "History:   Family History   Problem Relation Age of Onset   • Breast cancer Sister        Social History:   Social History     Socioeconomic History   • Marital status:    Tobacco Use   • Smoking status: Former     Packs/day: 2.00     Years: 5.00     Pack years: 10.00     Types: Cigarettes     Quit date: 2013     Years since quitting: 10.2   • Smokeless tobacco: Never   Vaping Use   • Vaping Use: Never used   Substance and Sexual Activity   • Alcohol use: Not Currently   • Drug use: Never   • Sexual activity: Defer       Medications:     Current Outpatient Medications:   •  benzonatate (TESSALON) 200 MG capsule, Take 1 capsule by mouth 3 (Three) Times a Day As Needed for Cough., Disp: 30 capsule, Rfl: 0  •  Bioflavonoid Products (Quercetin Complex Immune) capsule, Take 1 capsule by mouth Daily., Disp: , Rfl:   •  guaiFENesin-codeine (GUAIFENESIN AC) 100-10 MG/5ML liquid, Take 5 mL by mouth 4 (Four) Times a Day As Needed for Cough., Disp: 118 mL, Rfl: 0  •  levothyroxine (SYNTHROID, LEVOTHROID) 125 MCG tablet, Take 1 tablet by mouth Daily., Disp: 90 tablet, Rfl: 1  •  montelukast (SINGULAIR) 10 MG tablet, Take 1 tablet by mouth Daily., Disp: , Rfl:   •  omeprazole (priLOSEC) 20 MG capsule, Take 1 capsule by mouth Daily., Disp: 90 capsule, Rfl: 1  •  promethazine (PHENERGAN) 25 MG tablet, Take 1 tablet by mouth Every 6 (Six) Hours As Needed for Nausea or Vomiting., Disp: , Rfl:   •  HYDROcodone-acetaminophen (NORCO) 5-325 MG per tablet, Take 1 tablet by mouth Every 6 (Six) Hours As Needed for Moderate Pain., Disp: 30 tablet, Rfl: 0    Allergies:   No Known Allergies      Physical Exam:  Vital Signs:   Vitals:    03/14/23 1127   Weight: 44 kg (97 lb)   Height: 162.6 cm (64\")  Comment: pt reported     Body mass index is 16.65 kg/m².     Physical Exam  Vitals and nursing note reviewed.   Constitutional:       Comments: Chronically ill-appearing, cachectic   HENT:      Head: Normocephalic.   Pulmonary:      Effort: " Pulmonary effort is normal.   Neurological:      Mental Status: She is alert and oriented to person, place, and time.   Psychiatric:         Mood and Affect: Mood normal.         Behavior: Behavior normal.           Assessment/Plan:   Diagnoses and all orders for this visit:    1. Non-small cell cancer of left lung (HCC) (Primary)  Assessment & Plan:  We discussed different treatment options and the patient would like to move forward with prescription for hydrocodone 5-325 mg to use as needed for her cancer related pain.  Adverse effects discussed.  Avoid alcohol, driving, or operating machinery while taking medications.      Orders:  -     HYDROcodone-acetaminophen (NORCO) 5-325 MG per tablet; Take 1 tablet by mouth Every 6 (Six) Hours As Needed for Moderate Pain.  Dispense: 30 tablet; Refill: 0         Follow Up:   Return in about 3 months (around 6/14/2023) for Med Recheck.    Jaycee Tirado DO  [facility]

## 2023-03-20 ENCOUNTER — OFFICE VISIT (OUTPATIENT)
Dept: NEUROSURGERY | Facility: CLINIC | Age: 73
End: 2023-03-20
Payer: MEDICARE

## 2023-03-20 VITALS
SYSTOLIC BLOOD PRESSURE: 100 MMHG | WEIGHT: 91 LBS | HEIGHT: 64 IN | BODY MASS INDEX: 15.54 KG/M2 | TEMPERATURE: 97.3 F | DIASTOLIC BLOOD PRESSURE: 72 MMHG

## 2023-03-20 DIAGNOSIS — M47.819 FACET ARTHROPATHY: ICD-10-CM

## 2023-03-20 DIAGNOSIS — M54.2 NECK PAIN: Primary | ICD-10-CM

## 2023-03-20 DIAGNOSIS — R63.6 UNDERWEIGHT: ICD-10-CM

## 2023-03-20 DIAGNOSIS — M50.30 DDD (DEGENERATIVE DISC DISEASE), CERVICAL: ICD-10-CM

## 2023-03-20 PROCEDURE — 3074F SYST BP LT 130 MM HG: CPT | Performed by: NEUROLOGICAL SURGERY

## 2023-03-20 PROCEDURE — 99204 OFFICE O/P NEW MOD 45 MIN: CPT | Performed by: NEUROLOGICAL SURGERY

## 2023-03-20 PROCEDURE — 3078F DIAST BP <80 MM HG: CPT | Performed by: NEUROLOGICAL SURGERY

## 2023-03-20 NOTE — PROGRESS NOTES
NAME: JACQUES CORREA   DOS: 3/20/2023  : 1950  PCP: Jaycee Tirado DO    Chief Complaint:    Chief Complaint   Patient presents with   • Neck & bilateral arm/hand pain       History of Present Illness:  72 y.o. female   Is a 72-year-old female in neurosurgical consultation she has an advanced history of non-small cell lung cancer is status post radiation and chemotherapy.  She complains of bilateral hand pain the pain is present in the wrists bilaterally it does not really have a radiating quality issue in addition that she also has neck pain she denies any changes in her gait and station she experienced a significant mount of weight loss her BMI is 15    She has poor nutrition she is here for evaluation she takes Tylenol and ibuprofen it seems to help    PMHX  Allergies:  No Known Allergies  Medications    Current Outpatient Medications:   •  benzonatate (TESSALON) 200 MG capsule, Take 1 capsule by mouth 3 (Three) Times a Day As Needed for Cough., Disp: 30 capsule, Rfl: 0  •  Bioflavonoid Products (Quercetin Complex Immune) capsule, Take 1 capsule by mouth Daily., Disp: , Rfl:   •  guaiFENesin-codeine (GUAIFENESIN AC) 100-10 MG/5ML liquid, Take 5 mL by mouth 4 (Four) Times a Day As Needed for Cough., Disp: 118 mL, Rfl: 0  •  HYDROcodone-acetaminophen (NORCO) 5-325 MG per tablet, Take 1 tablet by mouth Every 6 (Six) Hours As Needed for Moderate Pain., Disp: 30 tablet, Rfl: 0  •  levothyroxine (SYNTHROID, LEVOTHROID) 125 MCG tablet, Take 1 tablet by mouth Daily., Disp: 90 tablet, Rfl: 1  •  montelukast (SINGULAIR) 10 MG tablet, Take 1 tablet by mouth Daily., Disp: , Rfl:   •  omeprazole (priLOSEC) 20 MG capsule, Take 1 capsule by mouth Daily., Disp: 90 capsule, Rfl: 1  •  promethazine (PHENERGAN) 25 MG tablet, Take 1 tablet by mouth Every 6 (Six) Hours As Needed for Nausea or Vomiting., Disp: , Rfl:   Past Medical History:  Past Medical History:   Diagnosis Date   • Breast cancer (HCC)    •  Disease of thyroid gland    • Hyperlipidemia    • Hypertension    • Hypertension    • Lung cancer (HCC)    • Thyroid disease      Past Surgical History:  Past Surgical History:   Procedure Laterality Date   • TUBAL ABDOMINAL LIGATION  1984    [place]     Social Hx:  Social History     Tobacco Use   • Smoking status: Former     Packs/day: 2.00     Years: 5.00     Pack years: 10.00     Types: Cigarettes     Quit date: 2013     Years since quitting: 10.2   • Smokeless tobacco: Never   Vaping Use   • Vaping Use: Never used   Substance Use Topics   • Alcohol use: Not Currently   • Drug use: Never     Family Hx:  Family History   Problem Relation Age of Onset   • Breast cancer Sister      Review of Systems:        Review of Systems   Constitutional: Negative for activity change, appetite change, chills, diaphoresis, fatigue, fever and unexpected weight change.   HENT: Negative for congestion, dental problem, drooling, ear discharge, ear pain, facial swelling, hearing loss, mouth sores, nosebleeds, postnasal drip, rhinorrhea, sinus pressure, sneezing, sore throat, tinnitus, trouble swallowing and voice change.    Eyes: Negative for photophobia, pain, discharge, redness, itching and visual disturbance.   Respiratory: Negative for apnea, cough, choking, chest tightness, shortness of breath, wheezing and stridor.    Cardiovascular: Negative for chest pain, palpitations and leg swelling.   Gastrointestinal: Negative for abdominal distention, abdominal pain, anal bleeding, blood in stool, constipation, diarrhea, nausea, rectal pain and vomiting.   Endocrine: Negative for cold intolerance, heat intolerance, polydipsia, polyphagia and polyuria.   Genitourinary: Negative for decreased urine volume, difficulty urinating, dysuria, enuresis, flank pain, frequency, genital sores, hematuria and urgency.   Musculoskeletal: Positive for neck pain and neck stiffness. Negative for arthralgias, back pain, gait problem, joint swelling and  myalgias.        ARMIDA ARM/HAND PAIN   Skin: Negative for color change, pallor, rash and wound.   Allergic/Immunologic: Negative for environmental allergies, food allergies and immunocompromised state.   Neurological: Negative for dizziness, tremors, seizures, syncope, facial asymmetry, speech difficulty, weakness, light-headedness, numbness and headaches.   Hematological: Negative for adenopathy. Does not bruise/bleed easily.   Psychiatric/Behavioral: Negative for agitation, behavioral problems, confusion, decreased concentration, dysphoric mood, hallucinations, self-injury, sleep disturbance and suicidal ideas. The patient is not nervous/anxious and is not hyperactive.    All other systems reviewed and are negative.       I have reviewed this note template and all pertinent parts of the review of systems social, family history, surgical history and medication list    Physical Examination:  Vitals:    03/20/23 1048   BP: 100/72   Temp: 97.3 °F (36.3 °C)      General Appearance:   She appears chronically ill and very lean, she has slight shortness of breath with talking  Neurological examination:  Neurologic Exam  Her neck is supple without evidence of masses or gross kyphotic angulation    It is nontender to palpation.    She is very cachectic looking around the neck    She has reasonable strength with pain and reproduction of left shoulder with passive range of motion indicative of left shoulder dysfunction    She has intrinsic atrophy bilaterally    She has muscular atrophy throughout consistent with her disease    She is able to walk and ambulate independently there is no evidence of isolated motor weakness    She has a positive Tinel's at the bilateral wrist    Decreased vibratory sensation bilaterally at the ankles  Intrinsic atrophy noted  She has no signs of hyperreflexia    Review of Imaging/DATA:  I reviewed an MRI personally she is got some contouring of the cord with cervical degeneration of the neck the  cord is flattened but there is no evidence of gross signal change and I certainly do not see any cancer she has significant facet arthropathy  Diagnoses/Plan:    Ms. Elias is a 72 y.o. female   1.  Advanced age non-small cell lung cancer status post radiation  2.  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  Arthritic neck pain  4.  Low BMI    Right now I think she can be treated nonsurgically she certainly does not have any manifestations of myelopathy.  I talked her about the pathophysiology of neuropathies and arthritic pain    I be happy to make referral to an interventional pain's therapist given her underlying diagnoses she is not a for any sort of surgical decompression nor do I think it would help her    We will make arrangements for referral to ongoing pain management  I gave her encouragement about her nutritional status  Physical therapy    She can see me back as needed

## 2023-06-16 DIAGNOSIS — C34.92 NON-SMALL CELL CANCER OF LEFT LUNG: ICD-10-CM

## 2023-06-16 RX ORDER — HYDROCODONE BITARTRATE AND ACETAMINOPHEN 5; 325 MG/1; MG/1
1 TABLET ORAL EVERY 6 HOURS PRN
Qty: 30 TABLET | Refills: 0 | Status: SHIPPED | OUTPATIENT
Start: 2023-06-16

## 2023-06-16 NOTE — TELEPHONE ENCOUNTER
Caller: Nessa Elias    Relationship: Self    Best call back number: [phone] -OK TO LEAVE DETAILED MESSAGE IF NO ANSWER    Requested Prescriptions:   Requested Prescriptions     Pending Prescriptions Disp Refills    HYDROcodone-acetaminophen (NORCO) 5-325 MG per tablet 30 tablet 0     Sig: Take 1 tablet by mouth Every 6 (Six) Hours As Needed for Moderate Pain.        Pharmacy where request should be sent: [address] [phone] [facility] [phone] FX     Last office visit with prescribing clinician: 3/14/2023   Last telemedicine visit with prescribing clinician: Visit date not found   Next office visit with prescribing clinician: 7/24/2023     Additional details provided by patient: PLEASE SEND REFILLS. DOES HAS ENOUGH TO GET THROUGH THE WEEKEND.    Does the patient have less than a 3 day supply:  [] Yes  [x] No    Would you like a call back once the refill request has been completed: [] Yes [x] No    If the office needs to give you a call back, can they leave a voicemail: [] Yes [x] No    Denver Hernandez Rep   06/16/23 14:36 EDT

## 2023-07-24 ENCOUNTER — OFFICE VISIT (OUTPATIENT)
Dept: FAMILY MEDICINE CLINIC | Facility: CLINIC | Age: 73
End: 2023-07-24
Payer: MEDICARE

## 2023-07-24 VITALS
WEIGHT: 85.8 LBS | HEART RATE: 116 BPM | OXYGEN SATURATION: 94 % | BODY MASS INDEX: 14.65 KG/M2 | TEMPERATURE: 97.5 F | SYSTOLIC BLOOD PRESSURE: 98 MMHG | HEIGHT: 64 IN | DIASTOLIC BLOOD PRESSURE: 60 MMHG

## 2023-07-24 DIAGNOSIS — E53.8 VITAMIN B12 DEFICIENCY: ICD-10-CM

## 2023-07-24 DIAGNOSIS — J43.1 PANLOBULAR EMPHYSEMA: ICD-10-CM

## 2023-07-24 DIAGNOSIS — Z00.00 MEDICARE ANNUAL WELLNESS VISIT, SUBSEQUENT: Primary | ICD-10-CM

## 2023-07-24 DIAGNOSIS — E43 SEVERE PROTEIN-CALORIE MALNUTRITION: ICD-10-CM

## 2023-07-24 DIAGNOSIS — D70.1 AGRANULOCYTOSIS SECONDARY TO CANCER CHEMOTHERAPY (CODE): ICD-10-CM

## 2023-07-24 DIAGNOSIS — E55.9 VITAMIN D DEFICIENCY: ICD-10-CM

## 2023-07-24 PROCEDURE — 36415 COLL VENOUS BLD VENIPUNCTURE: CPT | Performed by: FAMILY MEDICINE

## 2023-07-24 PROCEDURE — G0439 PPPS, SUBSEQ VISIT: HCPCS | Performed by: FAMILY MEDICINE

## 2023-07-24 PROCEDURE — 1160F RVW MEDS BY RX/DR IN RCRD: CPT | Performed by: FAMILY MEDICINE

## 2023-07-24 PROCEDURE — 3074F SYST BP LT 130 MM HG: CPT | Performed by: FAMILY MEDICINE

## 2023-07-24 PROCEDURE — 1170F FXNL STATUS ASSESSED: CPT | Performed by: FAMILY MEDICINE

## 2023-07-24 PROCEDURE — 3078F DIAST BP <80 MM HG: CPT | Performed by: FAMILY MEDICINE

## 2023-07-24 PROCEDURE — 1159F MED LIST DOCD IN RCRD: CPT | Performed by: FAMILY MEDICINE

## 2023-07-24 NOTE — ASSESSMENT & PLAN NOTE
COPD is  stable .  Warning signs of respiratory distress were reviewed with the patient.   Counseled to avoid exposure to cigarette smoke.

## 2023-07-24 NOTE — ASSESSMENT & PLAN NOTE
Patient's Body mass index is 14.73 kg/m².  BMI indicates moderate protein-calorie malnutrition with health conditions related to an underlying condition that include non-small cell lung cancer. Weight issue is worsening. BMI is is below average; BMI management plan is completed.  BMI management for patient includes the following: treating the underlying disease process and drinking boost at least twice daily .

## 2023-07-24 NOTE — PROGRESS NOTES
The ABCs of the Annual Wellness Visit  Subsequent Medicare Wellness Visit    Subjective      Nessa Elias is a 73 y.o. female who presents for a Subsequent Medicare Wellness Visit.  Chronic medical conditions include hypothyroidism, COPD, protein calorie malnutrition, and non-small cell lung cancer of the left lung.  She declines any further health maintenance at this time, continues to follow with oncology for treatment of non-small cell lung cancer.    The following portions of the patient's history were reviewed and   updated as appropriate: allergies, current medications, past family history, past medical history, past social history, past surgical history, and problem list.    Compared to one year ago, the patient feels her physical   health is worse.    Compared to one year ago, the patient feels her mental   health is the same.    Recent Hospitalizations:  She was not admitted to the hospital during the last year.       Current Medical Providers:  Patient Care Team:  Jaycee Tirado DO as PCP - General (Family Medicine)  Referring, Self as Referring Physician  Yessenia Quiroz MD as Consulting Physician (Hematology and Oncology)    Outpatient Medications Prior to Visit   Medication Sig Dispense Refill    HYDROcodone-acetaminophen (NORCO) 5-325 MG per tablet Take 1 tablet by mouth Every 6 (Six) Hours As Needed for Moderate Pain. 30 tablet 0    levothyroxine (SYNTHROID, LEVOTHROID) 125 MCG tablet Take 1 tablet by mouth Daily. 90 tablet 1    benzonatate (TESSALON) 200 MG capsule Take 1 capsule by mouth 3 (Three) Times a Day As Needed for Cough. 30 capsule 0    Bioflavonoid Products (Quercetin Complex Immune) capsule Take 1 capsule by mouth Daily.      guaiFENesin-codeine (GUAIFENESIN AC) 100-10 MG/5ML liquid Take 5 mL by mouth 4 (Four) Times a Day As Needed for Cough. 118 mL 0    montelukast (SINGULAIR) 10 MG tablet Take 1 tablet by mouth Daily.      omeprazole (priLOSEC) 20 MG capsule Take 1 capsule  "by mouth Daily. 90 capsule 1    promethazine (PHENERGAN) 25 MG tablet Take 1 tablet by mouth Every 6 (Six) Hours As Needed for Nausea or Vomiting.       No facility-administered medications prior to visit.       Opioid medication/s are on active medication list.  and I have evaluated her active treatment plan and pain score trends (see table).  There were no vitals filed for this visit.  I have reviewed the chart for potential of high risk medication and harmful drug interactions in the elderly.          Aspirin is not on active medication list.  Aspirin use is not indicated based on review of current medical condition/s. Risk of harm outweighs potential benefits.  .    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  ACP discussion was held with the patient during this visit. Patient does not have an advance directive, information provided.     Objective    Vitals:    07/24/23 1106   BP: 98/60   BP Location: Right arm   Patient Position: Sitting   Cuff Size: Pediatric   Pulse: 116   Temp: 97.5 °F (36.4 °C)   TempSrc: Temporal   SpO2: 94%   Weight: 38.9 kg (85 lb 12.8 oz)   Height: 162.6 cm (64\")     Estimated body mass index is 14.73 kg/m² as calculated from the following:    Height as of this encounter: 162.6 cm (64\").    Weight as of this encounter: 38.9 kg (85 lb 12.8 oz).    BMI is below normal parameters (malnutrition). Recommendations: treating the underlying disease " process      Does the patient have evidence of cognitive impairment?   No            HEALTH RISK ASSESSMENT    Smoking Status:  Social History     Tobacco Use   Smoking Status Former    Packs/day: 2.00    Years: 5.00    Pack years: 10.00    Types: Cigarettes    Quit date:     Years since quitting: 10.5   Smokeless Tobacco Never     Alcohol Consumption:  Social History     Substance and Sexual Activity   Alcohol Use Not Currently     Fall Risk Screen:    DEONNA Fall Risk Assessment was completed, and patient is at LOW risk for falls.Assessment completed on:2023    Depression Screenin/24/2023    11:03 AM   PHQ-2/PHQ-9 Depression Screening   Little Interest or Pleasure in Doing Things 0-->not at all   Feeling Down, Depressed or Hopeless 0-->not at all   PHQ-9: Brief Depression Severity Measure Score 0       Health Habits and Functional and Cognitive Screenin/24/2023    11:03 AM   Functional & Cognitive Status   Do you have difficulty preparing food and eating? No   Do you have difficulty bathing yourself, getting dressed or grooming yourself? No   Do you have difficulty using the toilet? No   Do you have difficulty moving around from place to place? No   Do you have trouble with steps or getting out of a bed or a chair? No   Current Diet Well Balanced Diet   Dental Exam Up to date   Eye Exam Up to date   Exercise (times per week) 9 times per week   Current Exercises Include Stair Step Machine   Do you need help using the phone?  No   Are you deaf or do you have serious difficulty hearing?  No   Do you need help to go to places out of walking distance? Yes   Do you need help shopping? No   Do you need help preparing meals?  No   Do you need help with housework?  No   Do you need help with laundry? No   Do you need help taking your medications? No   Do you need help managing money? No   Do you ever drive or ride in a car without wearing a seat belt? No   Have you felt unusual stress, anger or  loneliness in the last month? No   Who do you live with? Spouse   If you need help, do you have trouble finding someone available to you? No   Have you been bothered in the last four weeks by sexual problems? No   Do you have difficulty concentrating, remembering or making decisions? No       Age-appropriate Screening Schedule:  Refer to the list below for future screening recommendations based on patient's age, sex and/or medical conditions. Orders for these recommended tests are listed in the plan section. The patient has been provided with a written plan.    Health Maintenance   Topic Date Due    LIPID PANEL  09/20/2022    DXA SCAN  07/24/2023 (Originally 2/17/2019)    ZOSTER VACCINE (1 of 2) 07/24/2023 (Originally 5/14/2000)    Pneumococcal Vaccine 65+ (1 - PCV) 01/24/2024 (Originally 5/14/1956)    MAMMOGRAM  01/24/2024 (Originally 1950)    TDAP/TD VACCINES (1 - Tdap) 01/24/2024 (Originally 5/14/1969)    HEPATITIS C SCREENING  07/24/2024 (Originally 11/20/2018)    COVID-19 Vaccine (3 - Moderna series) 07/17/2025 (Originally 7/15/2021)    INFLUENZA VACCINE  10/01/2023    ANNUAL WELLNESS VISIT  07/24/2024    COLORECTAL CANCER SCREENING  Discontinued                  CMS Preventative Services Quick Reference  Risk Factors Identified During Encounter:    Chronic Pain: Natural history and expected course discussed. Questions answered.    The above risks/problems have been discussed with the patient.  Pertinent information has been shared with the patient in the After Visit Summary.    Diagnoses and all orders for this visit:    1. Medicare annual wellness visit, subsequent (Primary)  Assessment & Plan:  Fasting labs today, declines further health maintenance or vaccinations      2. 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 Severe protein-calorie malnutrition  Assessment & Plan:  Patient's Body mass index is 14.73  kg/m².  BMI indicates moderate protein-calorie malnutrition with health conditions related to an underlying condition that include non-small cell lung cancer. Weight issue is worsening. BMI is is below average; BMI management plan is completed.  BMI management for patient includes the following: treating the underlying disease process and drinking boost at least twice daily .    Orders:  -     Comprehensive Metabolic Panel; Future    4. Panlobular emphysema  Assessment & Plan:  COPD is  stable .  Warning signs of respiratory distress were reviewed with the patient.   Counseled to avoid exposure to cigarette smoke.          5. Vitamin D deficiency  -     Vitamin D,25-Hydroxy; Future    6. Vitamin B12 deficiency  -     Vitamin B12; Future        Follow Up:   Next Medicare Wellness visit to be scheduled in 1 year.      An After Visit Summary and PPPS were made available to the patient.

## 2023-07-25 LAB
25(OH)D3+25(OH)D2 SERPL-MCNC: 127 NG/ML (ref 30–100)
ALBUMIN SERPL-MCNC: 4 G/DL (ref 3.8–4.8)
ALBUMIN/GLOB SERPL: 1.1 {RATIO} (ref 1.2–2.2)
ALP SERPL-CCNC: 102 IU/L (ref 44–121)
ALT SERPL-CCNC: 10 IU/L (ref 0–32)
AST SERPL-CCNC: 26 IU/L (ref 0–40)
BASOPHILS # BLD AUTO: 0.1 X10E3/UL (ref 0–0.2)
BASOPHILS NFR BLD AUTO: 1 %
BILIRUB SERPL-MCNC: 0.6 MG/DL (ref 0–1.2)
BUN SERPL-MCNC: 24 MG/DL (ref 8–27)
BUN/CREAT SERPL: 27 (ref 12–28)
CALCIUM SERPL-MCNC: 10 MG/DL (ref 8.7–10.3)
CHLORIDE SERPL-SCNC: 102 MMOL/L (ref 96–106)
CO2 SERPL-SCNC: 16 MMOL/L (ref 20–29)
CREAT SERPL-MCNC: 0.89 MG/DL (ref 0.57–1)
EGFRCR SERPLBLD CKD-EPI 2021: 68 ML/MIN/1.73
EOSINOPHIL # BLD AUTO: 0.1 X10E3/UL (ref 0–0.4)
EOSINOPHIL NFR BLD AUTO: 2 %
ERYTHROCYTE [DISTWIDTH] IN BLOOD BY AUTOMATED COUNT: 15.2 % (ref 11.7–15.4)
GLOBULIN SER CALC-MCNC: 3.7 G/DL (ref 1.5–4.5)
GLUCOSE SERPL-MCNC: 128 MG/DL (ref 70–99)
HCT VFR BLD AUTO: 37.3 % (ref 34–46.6)
HGB BLD-MCNC: 12.3 G/DL (ref 11.1–15.9)
IMM GRANULOCYTES # BLD AUTO: 0 X10E3/UL (ref 0–0.1)
IMM GRANULOCYTES NFR BLD AUTO: 0 %
LYMPHOCYTES # BLD AUTO: 0.7 X10E3/UL (ref 0.7–3.1)
LYMPHOCYTES NFR BLD AUTO: 12 %
MCH RBC QN AUTO: 30.9 PG (ref 26.6–33)
MCHC RBC AUTO-ENTMCNC: 33 G/DL (ref 31.5–35.7)
MCV RBC AUTO: 94 FL (ref 79–97)
MONOCYTES # BLD AUTO: 0.7 X10E3/UL (ref 0.1–0.9)
MONOCYTES NFR BLD AUTO: 11 %
NEUTROPHILS # BLD AUTO: 4.5 X10E3/UL (ref 1.4–7)
NEUTROPHILS NFR BLD AUTO: 74 %
PLATELET # BLD AUTO: 262 X10E3/UL (ref 150–450)
POTASSIUM SERPL-SCNC: 5.9 MMOL/L (ref 3.5–5.2)
PROT SERPL-MCNC: 7.7 G/DL (ref 6–8.5)
RBC # BLD AUTO: 3.98 X10E6/UL (ref 3.77–5.28)
SODIUM SERPL-SCNC: 140 MMOL/L (ref 134–144)
VIT B12 SERPL-MCNC: 672 PG/ML (ref 232–1245)
WBC # BLD AUTO: 6.1 X10E3/UL (ref 3.4–10.8)

## 2023-07-31 ENCOUNTER — TELEPHONE (OUTPATIENT)
Dept: FAMILY MEDICINE CLINIC | Facility: CLINIC | Age: 73
End: 2023-07-31
Payer: COMMERCIAL

## 2023-09-13 DIAGNOSIS — C34.92 NON-SMALL CELL CANCER OF LEFT LUNG: ICD-10-CM

## 2023-09-13 NOTE — TELEPHONE ENCOUNTER
Caller: Nessa Elias    Relationship: Self    Best call back number: [phone]     Requested Prescriptions:   Requested Prescriptions     Pending Prescriptions Disp Refills    HYDROcodone-acetaminophen (NORCO) 5-325 MG per tablet 30 tablet 0     Sig: Take 1 tablet by mouth Every 6 (Six) Hours As Needed for Moderate Pain.      Pharmacy where request should be sent: [facility] [address] [place] [address] [phone] [facility] [phone] FX     Last office visit with prescribing clinician: 7/24/2023   Last telemedicine visit with prescribing clinician: Visit date not found   Next office visit with prescribing clinician: 1/24/2024     Denver Chinchilla Rep   09/13/23 13:44 EDT

## 2023-09-14 RX ORDER — HYDROCODONE BITARTRATE AND ACETAMINOPHEN 5; 325 MG/1; MG/1
1 TABLET ORAL EVERY 6 HOURS PRN
Qty: 30 TABLET | Refills: 0 | Status: SHIPPED | OUTPATIENT
Start: 2023-09-14

## 2023-10-20 DIAGNOSIS — C34.92 NON-SMALL CELL CANCER OF LEFT LUNG: ICD-10-CM

## 2023-10-20 NOTE — TELEPHONE ENCOUNTER
Caller: CurtNessa    Relationship: Self    Best call back number: [phone]     Requested Prescriptions:   Requested Prescriptions     Pending Prescriptions Disp Refills    HYDROcodone-acetaminophen (NORCO) 5-325 MG per tablet 30 tablet 0     Sig: Take 1 tablet by mouth Every 6 (Six) Hours As Needed for Moderate Pain.        Pharmacy where request should be sent: [facility] [address] [place] [address] [phone] [facility] [phone]      Last office visit with prescribing clinician: 7/24/2023   Last telemedicine visit with prescribing clinician: Visit date not found   Next office visit with prescribing clinician: 1/24/2024     Additional details provided by patient: PLEASE REFILL    Does the patient have less than a 3 day supply:  [] Yes  [x] No    Would you like a call back once the refill request has been completed: [] Yes [x] No    If the office needs to give you a call back, can they leave a voicemail: [] Yes [x] No    Denver Sahu Rep   10/20/23 16:08 EDT

## 2023-10-23 RX ORDER — HYDROCODONE BITARTRATE AND ACETAMINOPHEN 5; 325 MG/1; MG/1
1 TABLET ORAL EVERY 6 HOURS PRN
Qty: 30 TABLET | Refills: 0 | Status: SHIPPED | OUTPATIENT
Start: 2023-10-23

## 2023-10-25 ENCOUNTER — OFFICE VISIT (OUTPATIENT)
Dept: FAMILY MEDICINE CLINIC | Facility: CLINIC | Age: 73
End: 2023-10-25
Payer: MEDICARE

## 2023-10-25 VITALS
WEIGHT: 89.4 LBS | HEIGHT: 64 IN | SYSTOLIC BLOOD PRESSURE: 122 MMHG | OXYGEN SATURATION: 100 % | HEART RATE: 91 BPM | BODY MASS INDEX: 15.26 KG/M2 | DIASTOLIC BLOOD PRESSURE: 98 MMHG

## 2023-10-25 DIAGNOSIS — R09.82 POSTNASAL DRIP: Primary | ICD-10-CM

## 2023-10-25 PROCEDURE — 99213 OFFICE O/P EST LOW 20 MIN: CPT | Performed by: PHYSICIAN ASSISTANT

## 2023-10-25 PROCEDURE — 3080F DIAST BP >= 90 MM HG: CPT | Performed by: PHYSICIAN ASSISTANT

## 2023-10-25 PROCEDURE — 3074F SYST BP LT 130 MM HG: CPT | Performed by: PHYSICIAN ASSISTANT

## 2023-10-25 RX ORDER — FLUTICASONE PROPIONATE 50 MCG
2 SPRAY, SUSPENSION (ML) NASAL DAILY
Qty: 9.9 ML | Refills: 2 | Status: SHIPPED | OUTPATIENT
Start: 2023-10-25

## 2023-10-25 NOTE — PROGRESS NOTES
Office Note     Name: Nessa Elias    : 1950     MRN: [id number]     Chief Complaint  URI    Subjective     History of Present Illness:  Nessa Elias is a 73 y.o. female who presents today for eval of cough and congestion for several weeks.  She denies any known fever, body aches, or chills.  She states that it started when the seasons started to change.  She states that it is worse when she lays down at night and when she first gets up in the morning, but she does have a cough some throughout the day.  She denies any heartburn, nausea, or other signs of acid reflux.  She has been taking Mucinex DM in the mornings and her cough syrup at night.  She states that she is also drinking lots of water.  She sees a pulmonologist on 23 and oncology on 23.she states that she also has a CT scan of her chest scheduled on 23.    Past Medical History:   Past Medical History:   Diagnosis Date    Breast cancer     Disease of thyroid gland     Hyperlipidemia     Hypertension     Hypertension     Lung cancer     Thyroid disease        Past Surgical History:   Past Surgical History:   Procedure Laterality Date    TUBAL ABDOMINAL LIGATION      [place]       Family History:   Family History   Problem Relation Age of Onset    Breast cancer Sister        Social History:   Social History     Socioeconomic History    Marital status:    Tobacco Use    Smoking status: Former     Packs/day: 2.00     Years: 5.00     Additional pack years: 0.00     Total pack years: 10.00     Types: Cigarettes     Quit date:      Years since quitting: 10.8    Smokeless tobacco: Never   Vaping Use    Vaping Use: Never used   Substance and Sexual Activity    Alcohol use: Not Currently    Drug use: Never    Sexual activity: Defer       Immunizations:   Immunization History   Administered Date(s) Administered    COVID-19 (MODERNA) 1st,2nd,3rd Dose Monovalent 2021, 2021    Flu Vaccine Quad PF >36MO 2019,  "12/09/2021    Fluzone (or Fluarix & Flulaval for VFC) >6mos 12/09/2021    Fluzone High-Dose 65+yrs 10/02/2020, 10/14/2022    Influenza, Unspecified 11/15/2020        Medications:     Current Outpatient Medications:     benzonatate (TESSALON) 200 MG capsule, Take 1 capsule by mouth 3 (Three) Times a Day As Needed for Cough., Disp: 30 capsule, Rfl: 0    guaiFENesin-codeine (GUAIFENESIN AC) 100-10 MG/5ML liquid, Take 5 mL by mouth 4 (Four) Times a Day As Needed for Cough., Disp: 118 mL, Rfl: 0    HYDROcodone-acetaminophen (NORCO) 5-325 MG per tablet, Take 1 tablet by mouth Every 6 (Six) Hours As Needed for Moderate Pain., Disp: 30 tablet, Rfl: 0    fluticasone (FLONASE) 50 MCG/ACT nasal spray, 2 sprays into the nostril(s) as directed by provider Daily., Disp: 9.9 mL, Rfl: 2    Allergies:   No Known Allergies    Objective     Vital Signs  /98 (BP Location: Right arm, Patient Position: Sitting, Cuff Size: Adult)   Pulse 91   Ht 162.6 cm (64\")   Wt 40.6 kg (89 lb 6.4 oz)   SpO2 100%   BMI 15.35 kg/m²   Estimated body mass index is 15.35 kg/m² as calculated from the following:    Height as of this encounter: 162.6 cm (64\").    Weight as of this encounter: 40.6 kg (89 lb 6.4 oz).        Physical Exam  Vitals and nursing note reviewed.   Constitutional:       Comments: Chronically ill-appearing, cachectic   HENT:      Ears:      Comments: Fluid behind TMs bilaterally     Nose: Rhinorrhea present.      Mouth/Throat:      Mouth: No oral lesions.      Pharynx: Posterior oropharyngeal erythema present. No uvula swelling.   Cardiovascular:      Rate and Rhythm: Normal rate and regular rhythm.      Heart sounds: Normal heart sounds. No murmur heard.  Pulmonary:      Effort: Pulmonary effort is normal.      Breath sounds: Normal breath sounds. No wheezing.   Neurological:      Mental Status: She is alert and oriented to person, place, and time. Mental status is at baseline.   Psychiatric:         Mood and Affect: Mood " normal.         Behavior: Behavior normal.        Assessment and Plan     Assessment/Plan:  Diagnoses and all orders for this visit:    1. Postnasal drip (Primary)  Assessment & Plan:  Her oxygen is 100%, and she does not feel any more short of breath than she usually does.  Her symptoms seem to be related to postnasal sinus drainage.  I recommend that she try Flonase nasal spray to help reduce the discharge.  She is in agreement.    Orders:  -     fluticasone (FLONASE) 50 MCG/ACT nasal spray; 2 sprays into the nostril(s) as directed by provider Daily.  Dispense: 9.9 mL; Refill: 2        Follow Up  Return if symptoms worsen or fail to improve.    Charlotte Evans PA-C  [facility]

## 2023-11-11 PROBLEM — R09.82 POSTNASAL DRIP: Status: ACTIVE | Noted: 2023-11-11

## 2023-11-11 NOTE — ASSESSMENT & PLAN NOTE
Her oxygen is 100%, and she does not feel any more short of breath than she usually does.  Her symptoms seem to be related to postnasal sinus drainage.  I recommend that she try Flonase nasal spray to help reduce the discharge.  She is in agreement.

## 2023-12-05 ENCOUNTER — OFFICE VISIT (OUTPATIENT)
Dept: FAMILY MEDICINE CLINIC | Facility: CLINIC | Age: 73
End: 2023-12-05
Payer: MEDICARE

## 2023-12-05 VITALS
DIASTOLIC BLOOD PRESSURE: 80 MMHG | HEART RATE: 70 BPM | SYSTOLIC BLOOD PRESSURE: 118 MMHG | WEIGHT: 94 LBS | BODY MASS INDEX: 16.14 KG/M2 | OXYGEN SATURATION: 92 %

## 2023-12-05 DIAGNOSIS — M54.50 ACUTE LEFT-SIDED LOW BACK PAIN WITHOUT SCIATICA: Primary | ICD-10-CM

## 2023-12-05 PROCEDURE — 99213 OFFICE O/P EST LOW 20 MIN: CPT | Performed by: PHYSICIAN ASSISTANT

## 2023-12-05 PROCEDURE — 1159F MED LIST DOCD IN RCRD: CPT | Performed by: PHYSICIAN ASSISTANT

## 2023-12-05 PROCEDURE — 3079F DIAST BP 80-89 MM HG: CPT | Performed by: PHYSICIAN ASSISTANT

## 2023-12-05 PROCEDURE — 3074F SYST BP LT 130 MM HG: CPT | Performed by: PHYSICIAN ASSISTANT

## 2023-12-05 PROCEDURE — 1160F RVW MEDS BY RX/DR IN RCRD: CPT | Performed by: PHYSICIAN ASSISTANT

## 2023-12-05 RX ORDER — METHOCARBAMOL 750 MG/1
TABLET, FILM COATED ORAL
Qty: 30 TABLET | Refills: 1 | Status: SHIPPED | OUTPATIENT
Start: 2023-12-05

## 2023-12-05 RX ORDER — ALBUTEROL SULFATE 90 UG/1
2 AEROSOL, METERED RESPIRATORY (INHALATION)
COMMUNITY
Start: 2023-11-09

## 2023-12-05 RX ORDER — LORATADINE 10 MG/1
1 TABLET ORAL DAILY PRN
COMMUNITY
Start: 2023-11-09 | End: 2024-11-08

## 2023-12-05 RX ORDER — BUDESONIDE AND FORMOTEROL FUMARATE DIHYDRATE 160; 4.5 UG/1; UG/1
2 AEROSOL RESPIRATORY (INHALATION)
COMMUNITY
Start: 2023-11-09

## 2023-12-05 NOTE — PROGRESS NOTES
"Chief Complaint  Back Pain    Subjective        Nessa Elias presents to [facility]  History of Present Illness  Patient reports today secondary to having left-sided back pain for the past 5 days.  Patient reports ports no known inciting event.  Reports waking in the morning and having pain.  Patient denies any hip or leg pain denies any difficulty walking reports feeling stiffness and spasms in her back.  Patient denies taking any medication so far.  Patient denies any pain with urination denies any blood in her urine or difficulty urinating.  Back Pain        Objective   Vital Signs:  /80   Pulse 70   Wt 42.6 kg (94 lb)   SpO2 92%   BMI 16.14 kg/m²   Estimated body mass index is 16.14 kg/m² as calculated from the following:    Height as of 10/25/23: 162.6 cm (64\").    Weight as of this encounter: 42.6 kg (94 lb).               Physical Exam  Vitals and nursing note reviewed.   Constitutional:       General: She is not in acute distress.     Appearance: She is not ill-appearing.   HENT:      Mouth/Throat:      Pharynx: No oropharyngeal exudate.   Eyes:      Pupils: Pupils are equal, round, and reactive to light.   Cardiovascular:      Rate and Rhythm: Normal rate and regular rhythm.   Pulmonary:      Effort: Pulmonary effort is normal. No respiratory distress.   Abdominal:      Tenderness: There is no right CVA tenderness or left CVA tenderness.   Musculoskeletal:         General: Normal range of motion.      Comments: Patient is tender upon palpation to left lumbar paraspinous muscles.  Patient is non-tender upon palpation to cervical, thoracic and lumbar spine.     Skin:     General: Skin is warm and dry.   Neurological:      Gait: Gait normal.   Psychiatric:         Mood and Affect: Mood normal.        Result Review :                   Assessment and Plan   Diagnoses and all orders for this visit:    1. Acute left-sided low back pain without sciatica (Primary)  Assessment & " Plan:  Advised patient this is likely muscular rather than UTI related.  Patient prescribed methocarbamol to take at bedtime.  Recommended she start ice, heat and stretching.  Patient has previously prescribed hydrocodone for further relief of pain    Orders:  -     methocarbamol (ROBAXIN) 750 MG tablet; Take 1 tab po Bid as needed for muscle relaxation, caution sedation  Dispense: 30 tablet; Refill: 1  -     POC Urinalysis Dipstick, Automated             Follow Up   Return if symptoms worsen or fail to improve.  Patient was given instructions and counseling regarding her condition or for health maintenance advice. Please see specific information pulled into the AVS if appropriate.

## 2023-12-05 NOTE — ASSESSMENT & PLAN NOTE
Patient unable to leave sufficient sample for UA.  However based upon physical exam this date, advised patient this is likely muscular rather than UTI related.  Patient prescribed methocarbamol to take at bedtime.  Recommended she start ice, heat and stretching.  Patient has previously prescribed hydrocodone for further relief of pain

## 2023-12-12 ENCOUNTER — OFFICE VISIT (OUTPATIENT)
Dept: FAMILY MEDICINE CLINIC | Facility: CLINIC | Age: 73
End: 2023-12-12
Payer: MEDICARE

## 2023-12-12 VITALS
WEIGHT: 95.8 LBS | OXYGEN SATURATION: 94 % | HEART RATE: 74 BPM | DIASTOLIC BLOOD PRESSURE: 70 MMHG | HEIGHT: 64 IN | SYSTOLIC BLOOD PRESSURE: 112 MMHG | BODY MASS INDEX: 16.35 KG/M2

## 2023-12-12 DIAGNOSIS — M25.552 LEFT HIP PAIN: ICD-10-CM

## 2023-12-12 DIAGNOSIS — C77.1 SECONDARY AND UNSPECIFIED MALIGNANT NEOPLASM OF INTRATHORACIC LYMPH NODES: ICD-10-CM

## 2023-12-12 DIAGNOSIS — M54.50 ACUTE LEFT-SIDED LOW BACK PAIN WITHOUT SCIATICA: Primary | ICD-10-CM

## 2023-12-12 PROCEDURE — 3074F SYST BP LT 130 MM HG: CPT | Performed by: FAMILY MEDICINE

## 2023-12-12 PROCEDURE — 1160F RVW MEDS BY RX/DR IN RCRD: CPT | Performed by: FAMILY MEDICINE

## 2023-12-12 PROCEDURE — 99213 OFFICE O/P EST LOW 20 MIN: CPT | Performed by: FAMILY MEDICINE

## 2023-12-12 PROCEDURE — 1159F MED LIST DOCD IN RCRD: CPT | Performed by: FAMILY MEDICINE

## 2023-12-12 PROCEDURE — 3078F DIAST BP <80 MM HG: CPT | Performed by: FAMILY MEDICINE

## 2023-12-12 NOTE — PROGRESS NOTES
Date: 2023   Patient Name: Nessa Elias  : 1950   MRN: [id number]     Chief Complaint:    Chief Complaint   Patient presents with    Back Pain     Low back pain       History of Present Illness: Nessa Elias is a 73 y.o. female who is here today for low back and left hip pain.  She reports the pain came on when she woke up 1 morning and has felt like a deep aching pain.  Taking her pain medication does help but the pain is pretty much there constantly.  She was treated with muscle relaxers at her most recent appointment which did not help at all.  The area is not tender to palpate and she denies any radicular symptoms or radiation.  Of note, she does have active non-small cell lung cancer reportedly currently stable per patient.         Review of Systems:   Review of Systems   Constitutional:  Negative for chills, fatigue and fever.   Respiratory:  Negative for cough and shortness of breath.    Cardiovascular:  Negative for chest pain and palpitations.   Gastrointestinal:  Negative for abdominal pain, constipation, diarrhea, nausea and vomiting.   Musculoskeletal:  Positive for arthralgias and back pain. Negative for myalgias.   Neurological:  Negative for dizziness and headache.   Psychiatric/Behavioral:  Negative for depressed mood. The patient is not nervous/anxious.        Past Medical History:   Past Medical History:   Diagnosis Date    Breast cancer     Disease of thyroid gland     Hyperlipidemia     Hypertension     Hypertension     Lung cancer     Thyroid disease        Past Surgical History:   Past Surgical History:   Procedure Laterality Date    TUBAL ABDOMINAL LIGATION      [place]       Family History:   Family History   Problem Relation Age of Onset    Breast cancer Sister        Social History:   Social History     Socioeconomic History    Marital status:    Tobacco Use    Smoking status: Former     Packs/day: 2.00     Years: 5.00     Additional pack years: 0.00     Total  "pack years: 10.00     Types: Cigarettes     Quit date: 2013     Years since quitting: 10.9    Smokeless tobacco: Never   Vaping Use    Vaping Use: Never used   Substance and Sexual Activity    Alcohol use: Not Currently    Drug use: Never    Sexual activity: Defer       Medications:     Current Outpatient Medications:     albuterol sulfate  (90 Base) MCG/ACT inhaler, Inhale 2 puffs., Disp: , Rfl:     benzonatate (TESSALON) 200 MG capsule, Take 1 capsule by mouth 3 (Three) Times a Day As Needed for Cough., Disp: 30 capsule, Rfl: 0    budesonide-formoterol (Symbicort) 160-4.5 MCG/ACT inhaler, Inhale 2 puffs., Disp: , Rfl:     fluticasone (FLONASE) 50 MCG/ACT nasal spray, 2 sprays into the nostril(s) as directed by provider Daily., Disp: 9.9 mL, Rfl: 2    guaiFENesin-codeine (GUAIFENESIN AC) 100-10 MG/5ML liquid, Take 5 mL by mouth 4 (Four) Times a Day As Needed for Cough., Disp: 118 mL, Rfl: 0    HYDROcodone-acetaminophen (NORCO) 5-325 MG per tablet, Take 1 tablet by mouth Every 6 (Six) Hours As Needed for Moderate Pain., Disp: 30 tablet, Rfl: 0    loratadine (CLARITIN) 10 MG tablet, Take 1 tablet by mouth Daily As Needed., Disp: , Rfl:     methocarbamol (ROBAXIN) 750 MG tablet, Take 1 tab po Bid as needed for muscle relaxation, caution sedation, Disp: 30 tablet, Rfl: 1    Allergies:   No Known Allergies      Physical Exam:  Vital Signs:   Vitals:    12/12/23 1326   BP: 112/70   BP Location: Left arm   Patient Position: Sitting   Cuff Size: Adult   Pulse: 74   SpO2: 94%   Weight: 43.5 kg (95 lb 12.8 oz)   Height: 162.6 cm (64\")     Body mass index is 16.44 kg/m².     Physical Exam  Vitals and nursing note reviewed.   Constitutional:       Comments: Cachectic, chronically ill-appearing   HENT:      Head: Normocephalic.   Pulmonary:      Effort: Pulmonary effort is normal.   Musculoskeletal:      Comments: No tenderness to palpation or palpable irregularities of left hip, lumbar spine, or left SI joint. "   Neurological:      Mental Status: She is alert and oriented to person, place, and time.   Psychiatric:         Mood and Affect: Mood normal.         Behavior: Behavior normal.           Assessment/Plan:   Diagnoses and all orders for this visit:    1. Acute left-sided low back pain without sciatica (Primary)  Assessment & Plan:  Will proceed with x-rays as she is certainly high risk for potential metastatic disease    Orders:  -     XR Spine Lumbar 2 or 3 View; Future    2. 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    Jaycee Tirado, DO  [facility]

## 2023-12-19 DIAGNOSIS — C34.92 NON-SMALL CELL CANCER OF LEFT LUNG: ICD-10-CM

## 2023-12-19 RX ORDER — HYDROCODONE BITARTRATE AND ACETAMINOPHEN 5; 325 MG/1; MG/1
1 TABLET ORAL EVERY 6 HOURS PRN
Qty: 30 TABLET | Refills: 0 | Status: SHIPPED | OUTPATIENT
Start: 2023-12-19

## 2023-12-19 NOTE — TELEPHONE ENCOUNTER
Caller: Nessa Elias    Relationship: Self    Best call back number: [phone]    Requested Prescriptions:   Requested Prescriptions     Pending Prescriptions Disp Refills    HYDROcodone-acetaminophen (NORCO) 5-325 MG per tablet 30 tablet 0     Sig: Take 1 tablet by mouth Every 6 (Six) Hours As Needed for Moderate Pain.        Pharmacy where request should be sent: [facility] [address] [place] [address] [phone] [facility] [phone]     Last office visit with prescribing clinician: 12/12/2023   Last telemedicine visit with prescribing clinician: Visit date not found   Next office visit with prescribing clinician: 1/24/2024     Additional details provided by patient:     Does the patient have less than a 3 day supply:  [x] Yes  [] No    Denver Merrill Rep   12/19/23 09:48 EST

## 2024-01-24 ENCOUNTER — OFFICE VISIT (OUTPATIENT)
Dept: FAMILY MEDICINE CLINIC | Facility: CLINIC | Age: 74
End: 2024-01-24
Payer: MEDICARE

## 2024-01-24 VITALS
SYSTOLIC BLOOD PRESSURE: 122 MMHG | OXYGEN SATURATION: 91 % | HEART RATE: 68 BPM | DIASTOLIC BLOOD PRESSURE: 84 MMHG | BODY MASS INDEX: 15.45 KG/M2 | HEIGHT: 64 IN | WEIGHT: 90.5 LBS

## 2024-01-24 DIAGNOSIS — E55.9 VITAMIN D DEFICIENCY: ICD-10-CM

## 2024-01-24 DIAGNOSIS — C34.92 NON-SMALL CELL CANCER OF LEFT LUNG: Primary | ICD-10-CM

## 2024-01-24 DIAGNOSIS — E53.8 VITAMIN B12 DEFICIENCY: ICD-10-CM

## 2024-01-24 DIAGNOSIS — E46 PROTEIN-CALORIE MALNUTRITION, UNSPECIFIED SEVERITY: ICD-10-CM

## 2024-01-24 DIAGNOSIS — M54.50 ACUTE LEFT-SIDED LOW BACK PAIN WITHOUT SCIATICA: ICD-10-CM

## 2024-01-24 DIAGNOSIS — E03.9 ACQUIRED HYPOTHYROIDISM: ICD-10-CM

## 2024-01-24 PROCEDURE — 3079F DIAST BP 80-89 MM HG: CPT | Performed by: FAMILY MEDICINE

## 2024-01-24 PROCEDURE — 3074F SYST BP LT 130 MM HG: CPT | Performed by: FAMILY MEDICINE

## 2024-01-24 PROCEDURE — 99214 OFFICE O/P EST MOD 30 MIN: CPT | Performed by: FAMILY MEDICINE

## 2024-01-24 PROCEDURE — 1159F MED LIST DOCD IN RCRD: CPT | Performed by: FAMILY MEDICINE

## 2024-01-24 PROCEDURE — 1160F RVW MEDS BY RX/DR IN RCRD: CPT | Performed by: FAMILY MEDICINE

## 2024-01-24 RX ORDER — METHOCARBAMOL 750 MG/1
TABLET, FILM COATED ORAL
Qty: 60 TABLET | Refills: 5 | Status: SHIPPED | OUTPATIENT
Start: 2024-01-24

## 2024-01-24 RX ORDER — HYDROCODONE BITARTRATE AND ACETAMINOPHEN 5; 325 MG/1; MG/1
1 TABLET ORAL EVERY 12 HOURS PRN
Qty: 30 TABLET | Refills: 0 | Status: SHIPPED | OUTPATIENT
Start: 2024-01-24

## 2024-01-24 NOTE — ASSESSMENT & PLAN NOTE
Malignancy is stable per patient at this time however she continues to have cancer related pain.  I have encouraged her to break her hydrocodone in half and try to take daily or every other day for pain control to help improve her quality of life and ability to perform ADLs.  Hydrocodone refilled, Johnny reviewed.  CSA signed, patient unable to urinate today.  Adverse effects discussed.  Avoid alcohol, driving, or operating machinery while taking medications.

## 2024-01-24 NOTE — ASSESSMENT & PLAN NOTE
Patient's Body mass index is 15.53 kg/m².  BMI indicates moderate protein-calorie malnutrition with health conditions related to an underlying condition that include non-small cell lung cancer. Weight issue is worsening. BMI is is below average; BMI management plan is completed.  BMI management for patient includes the following: treating the underlying disease process and drinking boost at least twice daily .

## 2024-01-24 NOTE — PROGRESS NOTES
Date: 2024   Patient Name: Nessa Elias  : 1950   MRN: [id number]     Chief Complaint:    Chief Complaint   Patient presents with    Back Pain     Controlled substance refill    Arthritis       History of Present Illness: Nessa Elias is a 73 y.o. female who is here today to follow up for small cell lung cancer and chronic back pain.  She requests refill of hydrocodone which she takes as needed for chronic pain and cancer related pain.  She denies side effects medication.  She does not take it daily but does report worsening pain over the last few months.  She is due for labs today as well.         Review of Systems:   Review of Systems   Constitutional:  Negative for chills, fatigue and fever.   Respiratory:  Positive for shortness of breath. Negative for cough.    Cardiovascular:  Negative for chest pain and palpitations.   Gastrointestinal:  Negative for abdominal pain, constipation, diarrhea, nausea and vomiting.   Musculoskeletal:  Positive for arthralgias, back pain and myalgias.   Neurological:  Negative for dizziness and headache.   Psychiatric/Behavioral:  Negative for depressed mood. The patient is not nervous/anxious.        Past Medical History:   Past Medical History:   Diagnosis Date    Breast cancer     Disease of thyroid gland     Hyperlipidemia     Hypertension     Hypertension     Lung cancer     Thyroid disease        Past Surgical History:   Past Surgical History:   Procedure Laterality Date    TUBAL ABDOMINAL LIGATION      [place]       Family History:   Family History   Problem Relation Age of Onset    Breast cancer Sister        Social History:   Social History     Socioeconomic History    Marital status:    Tobacco Use    Smoking status: Former     Packs/day: 2.00     Years: 5.00     Additional pack years: 0.00     Total pack years: 10.00     Types: Cigarettes     Quit date:      Years since quittin.0    Smokeless tobacco: Never   Vaping Use    Vaping  "Use: Never used   Substance and Sexual Activity    Alcohol use: Not Currently    Drug use: Never    Sexual activity: Defer       Medications:     Current Outpatient Medications:     albuterol sulfate  (90 Base) MCG/ACT inhaler, Inhale 2 puffs., Disp: , Rfl:     budesonide-formoterol (Symbicort) 160-4.5 MCG/ACT inhaler, Inhale 2 puffs., Disp: , Rfl:     fluticasone (FLONASE) 50 MCG/ACT nasal spray, 2 sprays into the nostril(s) as directed by provider Daily., Disp: 9.9 mL, Rfl: 2    guaiFENesin-codeine (GUAIFENESIN AC) 100-10 MG/5ML liquid, Take 5 mL by mouth 4 (Four) Times a Day As Needed for Cough., Disp: 118 mL, Rfl: 0    HYDROcodone-acetaminophen (NORCO) 5-325 MG per tablet, Take 1 tablet by mouth Every 12 (Twelve) Hours As Needed for Moderate Pain., Disp: 30 tablet, Rfl: 0    loratadine (CLARITIN) 10 MG tablet, Take 1 tablet by mouth Daily As Needed., Disp: , Rfl:     methocarbamol (ROBAXIN) 750 MG tablet, Take 1 tab po Bid as needed for muscle relaxation, caution sedation, Disp: 60 tablet, Rfl: 5    Allergies:   No Known Allergies    PHQ-2 Total Score: 0   PHQ-9 Total Score: 0     Physical Exam:  Vital Signs:   Vitals:    01/24/24 1059   BP: 122/84   BP Location: Left arm   Patient Position: Sitting   Cuff Size: Adult   Pulse: 68   SpO2: 91%   Weight: 41.1 kg (90 lb 8 oz)   Height: 162.6 cm (64\")     Body mass index is 15.53 kg/m².     Physical Exam  Vitals and nursing note reviewed.   Constitutional:       Comments: Cachectic   Cardiovascular:      Rate and Rhythm: Normal rate and regular rhythm.      Heart sounds: Normal heart sounds. No murmur heard.  Pulmonary:      Effort: Pulmonary effort is normal.      Breath sounds: Decreased air movement present. No wheezing.   Neurological:      Mental Status: She is alert. Mental status is at baseline.   Psychiatric:         Mood and Affect: Mood normal.         Behavior: Behavior normal.           Assessment/Plan:   Diagnoses and all orders for this " visit:    1. Non-small cell cancer of left lung (Primary)  Assessment & Plan:  Malignancy is stable per patient at this time however she continues to have cancer related pain.  I have encouraged her to break her hydrocodone in half and try to take daily or every other day for pain control to help improve her quality of life and ability to perform ADLs.  Hydrocodone refilled, Johnny reviewed.  CSA signed, patient unable to urinate today.  Adverse effects discussed.  Avoid alcohol, driving, or operating machinery while taking medications.      Orders:  -     HYDROcodone-acetaminophen (NORCO) 5-325 MG per tablet; Take 1 tablet by mouth Every 12 (Twelve) Hours As Needed for Moderate Pain.  Dispense: 30 tablet; Refill: 0    2. Acute left-sided low back pain without sciatica  Assessment & Plan:  Continue hydrocodone as needed.      Orders:  -     methocarbamol (ROBAXIN) 750 MG tablet; Take 1 tab po Bid as needed for muscle relaxation, caution sedation  Dispense: 60 tablet; Refill: 5    3. Acquired hypothyroidism  Assessment & Plan:  Stable, labs today and medications refilled    Orders:  -     TSH; Future    4. Protein-calorie malnutrition, unspecified severity  Assessment & Plan:  Patient's Body mass index is 15.53 kg/m².  BMI indicates moderate protein-calorie malnutrition with health conditions related to an underlying condition that include non-small cell lung cancer. Weight issue is worsening. BMI is is below average; BMI management plan is completed.  BMI management for patient includes the following: treating the underlying disease process and drinking boost at least twice daily .    Orders:  -     CBC Auto Differential; Future  -     Comprehensive Metabolic Panel; Future    5. Vitamin D deficiency  -     Vitamin D,25-Hydroxy; Future    6. Vitamin B12 deficiency  -     Vitamin B12; Future           Follow Up:   Return in about 3 months (around 4/24/2024) for  refill telehealth.    Jaycee Tirado, DO  [facility]  Primary Care [facility]

## 2024-02-13 ENCOUNTER — TELEPHONE (OUTPATIENT)
Dept: FAMILY MEDICINE CLINIC | Facility: CLINIC | Age: 74
End: 2024-02-13
Payer: COMMERCIAL

## 2024-02-14 ENCOUNTER — CLINICAL SUPPORT (OUTPATIENT)
Dept: FAMILY MEDICINE CLINIC | Facility: CLINIC | Age: 74
End: 2024-02-14
Payer: MEDICARE

## 2024-02-14 ENCOUNTER — LAB (OUTPATIENT)
Dept: FAMILY MEDICINE CLINIC | Facility: CLINIC | Age: 74
End: 2024-02-14
Payer: MEDICARE

## 2024-02-14 DIAGNOSIS — E03.9 ACQUIRED HYPOTHYROIDISM: ICD-10-CM

## 2024-02-14 DIAGNOSIS — E53.8 VITAMIN B12 DEFICIENCY: ICD-10-CM

## 2024-02-14 DIAGNOSIS — E46 PROTEIN-CALORIE MALNUTRITION, UNSPECIFIED SEVERITY: ICD-10-CM

## 2024-02-14 DIAGNOSIS — E55.9 VITAMIN D DEFICIENCY: ICD-10-CM

## 2024-02-14 PROCEDURE — 36415 COLL VENOUS BLD VENIPUNCTURE: CPT | Performed by: FAMILY MEDICINE

## 2024-02-15 LAB
25(OH)D3+25(OH)D2 SERPL-MCNC: 29 NG/ML (ref 30–100)
ALBUMIN SERPL-MCNC: 4.6 G/DL (ref 3.8–4.8)
ALBUMIN/GLOB SERPL: 1.5 {RATIO} (ref 1.2–2.2)
ALP SERPL-CCNC: 101 IU/L (ref 44–121)
ALT SERPL-CCNC: 26 IU/L (ref 0–32)
AST SERPL-CCNC: 45 IU/L (ref 0–40)
BASOPHILS # BLD AUTO: 0 X10E3/UL (ref 0–0.2)
BASOPHILS NFR BLD AUTO: 1 %
BILIRUB SERPL-MCNC: 1.2 MG/DL (ref 0–1.2)
BUN SERPL-MCNC: 21 MG/DL (ref 8–27)
BUN/CREAT SERPL: 21 (ref 12–28)
CALCIUM SERPL-MCNC: 9.9 MG/DL (ref 8.7–10.3)
CHLORIDE SERPL-SCNC: 98 MMOL/L (ref 96–106)
CO2 SERPL-SCNC: 20 MMOL/L (ref 20–29)
CREAT SERPL-MCNC: 1.01 MG/DL (ref 0.57–1)
EGFRCR SERPLBLD CKD-EPI 2021: 59 ML/MIN/1.73
EOSINOPHIL # BLD AUTO: 0 X10E3/UL (ref 0–0.4)
EOSINOPHIL NFR BLD AUTO: 1 %
ERYTHROCYTE [DISTWIDTH] IN BLOOD BY AUTOMATED COUNT: 15.3 % (ref 11.7–15.4)
GLOBULIN SER CALC-MCNC: 3 G/DL (ref 1.5–4.5)
GLUCOSE SERPL-MCNC: 100 MG/DL (ref 70–99)
HCT VFR BLD AUTO: 33.6 % (ref 34–46.6)
HGB BLD-MCNC: 11.6 G/DL (ref 11.1–15.9)
IMM GRANULOCYTES # BLD AUTO: 0 X10E3/UL (ref 0–0.1)
IMM GRANULOCYTES NFR BLD AUTO: 0 %
LYMPHOCYTES # BLD AUTO: 0.7 X10E3/UL (ref 0.7–3.1)
LYMPHOCYTES NFR BLD AUTO: 9 %
MCH RBC QN AUTO: 35.4 PG (ref 26.6–33)
MCHC RBC AUTO-ENTMCNC: 34.5 G/DL (ref 31.5–35.7)
MCV RBC AUTO: 102 FL (ref 79–97)
MONOCYTES # BLD AUTO: 0.6 X10E3/UL (ref 0.1–0.9)
MONOCYTES NFR BLD AUTO: 9 %
NEUTROPHILS # BLD AUTO: 5.7 X10E3/UL (ref 1.4–7)
NEUTROPHILS NFR BLD AUTO: 80 %
PLATELET # BLD AUTO: 196 X10E3/UL (ref 150–450)
POTASSIUM SERPL-SCNC: 4.2 MMOL/L (ref 3.5–5.2)
PROT SERPL-MCNC: 7.6 G/DL (ref 6–8.5)
RBC # BLD AUTO: 3.28 X10E6/UL (ref 3.77–5.28)
SODIUM SERPL-SCNC: 138 MMOL/L (ref 134–144)
TSH SERPL DL<=0.005 MIU/L-ACNC: 384 UIU/ML (ref 0.45–4.5)
VIT B12 SERPL-MCNC: 1447 PG/ML (ref 232–1245)
WBC # BLD AUTO: 7.1 X10E3/UL (ref 3.4–10.8)

## 2024-02-20 ENCOUNTER — TELEPHONE (OUTPATIENT)
Dept: FAMILY MEDICINE CLINIC | Facility: CLINIC | Age: 74
End: 2024-02-20
Payer: COMMERCIAL

## 2024-02-20 NOTE — TELEPHONE ENCOUNTER
Caller: Nessa Elias    Relationship: Self    Best call back number: [phone]    Caller requesting test results: YES    What test was performed: LAB    When was the test performed: LAST WEEK     Where was the test performed: OFFICE